# Patient Record
Sex: FEMALE | Race: WHITE | NOT HISPANIC OR LATINO | URBAN - METROPOLITAN AREA
[De-identification: names, ages, dates, MRNs, and addresses within clinical notes are randomized per-mention and may not be internally consistent; named-entity substitution may affect disease eponyms.]

---

## 2017-09-24 ENCOUNTER — INPATIENT (INPATIENT)
Facility: HOSPITAL | Age: 67
LOS: 4 days | Discharge: HOME | DRG: 605 | End: 2017-09-29
Attending: STUDENT IN AN ORGANIZED HEALTH CARE EDUCATION/TRAINING PROGRAM | Admitting: STUDENT IN AN ORGANIZED HEALTH CARE EDUCATION/TRAINING PROGRAM
Payer: MEDICARE

## 2017-09-24 VITALS
SYSTOLIC BLOOD PRESSURE: 124 MMHG | OXYGEN SATURATION: 95 % | TEMPERATURE: 99 F | HEART RATE: 70 BPM | RESPIRATION RATE: 16 BRPM | DIASTOLIC BLOOD PRESSURE: 83 MMHG

## 2017-09-24 DIAGNOSIS — L03.90 CELLULITIS, UNSPECIFIED: ICD-10-CM

## 2017-09-24 DIAGNOSIS — E78.00 PURE HYPERCHOLESTEROLEMIA, UNSPECIFIED: ICD-10-CM

## 2017-09-24 DIAGNOSIS — I48.91 UNSPECIFIED ATRIAL FIBRILLATION: ICD-10-CM

## 2017-09-24 DIAGNOSIS — I10 ESSENTIAL (PRIMARY) HYPERTENSION: ICD-10-CM

## 2017-09-24 LAB
ALBUMIN SERPL ELPH-MCNC: 4.2 G/DL — SIGNIFICANT CHANGE UP (ref 3.3–5)
ALP SERPL-CCNC: 80 U/L — SIGNIFICANT CHANGE UP (ref 40–120)
ALT FLD-CCNC: 13 U/L RC — SIGNIFICANT CHANGE UP (ref 10–45)
ANION GAP SERPL CALC-SCNC: 15 MMOL/L — SIGNIFICANT CHANGE UP (ref 5–17)
APTT BLD: 37.2 SEC — SIGNIFICANT CHANGE UP (ref 27.5–37.4)
AST SERPL-CCNC: 17 U/L — SIGNIFICANT CHANGE UP (ref 10–40)
BASOPHILS # BLD AUTO: 0.1 K/UL — SIGNIFICANT CHANGE UP (ref 0–0.2)
BASOPHILS NFR BLD AUTO: 0.6 % — SIGNIFICANT CHANGE UP (ref 0–2)
BILIRUB SERPL-MCNC: 0.4 MG/DL — SIGNIFICANT CHANGE UP (ref 0.2–1.2)
BUN SERPL-MCNC: 19 MG/DL — SIGNIFICANT CHANGE UP (ref 7–23)
CALCIUM SERPL-MCNC: 10.2 MG/DL — SIGNIFICANT CHANGE UP (ref 8.4–10.5)
CHLORIDE SERPL-SCNC: 103 MMOL/L — SIGNIFICANT CHANGE UP (ref 96–108)
CO2 SERPL-SCNC: 25 MMOL/L — SIGNIFICANT CHANGE UP (ref 22–31)
CREAT SERPL-MCNC: 0.81 MG/DL — SIGNIFICANT CHANGE UP (ref 0.5–1.3)
EOSINOPHIL # BLD AUTO: 0.1 K/UL — SIGNIFICANT CHANGE UP (ref 0–0.5)
EOSINOPHIL NFR BLD AUTO: 1 % — SIGNIFICANT CHANGE UP (ref 0–6)
GAS PNL BLDV: SIGNIFICANT CHANGE UP
GLUCOSE SERPL-MCNC: 96 MG/DL — SIGNIFICANT CHANGE UP (ref 70–99)
HCT VFR BLD CALC: 44.2 % — SIGNIFICANT CHANGE UP (ref 34.5–45)
HGB BLD-MCNC: 14.2 G/DL — SIGNIFICANT CHANGE UP (ref 11.5–15.5)
INR BLD: 1.42 RATIO — HIGH (ref 0.88–1.16)
LYMPHOCYTES # BLD AUTO: 2.9 K/UL — SIGNIFICANT CHANGE UP (ref 1–3.3)
LYMPHOCYTES # BLD AUTO: 24.2 % — SIGNIFICANT CHANGE UP (ref 13–44)
MCHC RBC-ENTMCNC: 30.2 PG — SIGNIFICANT CHANGE UP (ref 27–34)
MCHC RBC-ENTMCNC: 32.1 GM/DL — SIGNIFICANT CHANGE UP (ref 32–36)
MCV RBC AUTO: 93.9 FL — SIGNIFICANT CHANGE UP (ref 80–100)
MONOCYTES # BLD AUTO: 0.8 K/UL — SIGNIFICANT CHANGE UP (ref 0–0.9)
MONOCYTES NFR BLD AUTO: 7 % — SIGNIFICANT CHANGE UP (ref 2–14)
NEUTROPHILS # BLD AUTO: 8 K/UL — HIGH (ref 1.8–7.4)
NEUTROPHILS NFR BLD AUTO: 67.1 % — SIGNIFICANT CHANGE UP (ref 43–77)
PLATELET # BLD AUTO: 387 K/UL — SIGNIFICANT CHANGE UP (ref 150–400)
POTASSIUM SERPL-MCNC: 4.2 MMOL/L — SIGNIFICANT CHANGE UP (ref 3.5–5.3)
POTASSIUM SERPL-SCNC: 4.2 MMOL/L — SIGNIFICANT CHANGE UP (ref 3.5–5.3)
PROT SERPL-MCNC: 8 G/DL — SIGNIFICANT CHANGE UP (ref 6–8.3)
PROTHROM AB SERPL-ACNC: 15.6 SEC — HIGH (ref 9.8–12.7)
RBC # BLD: 4.71 M/UL — SIGNIFICANT CHANGE UP (ref 3.8–5.2)
RBC # FLD: 11.5 % — SIGNIFICANT CHANGE UP (ref 10.3–14.5)
SODIUM SERPL-SCNC: 143 MMOL/L — SIGNIFICANT CHANGE UP (ref 135–145)
WBC # BLD: 12 K/UL — HIGH (ref 3.8–10.5)
WBC # FLD AUTO: 12 K/UL — HIGH (ref 3.8–10.5)

## 2017-09-24 PROCEDURE — 73610 X-RAY EXAM OF ANKLE: CPT | Mod: 26,LT

## 2017-09-24 PROCEDURE — 73630 X-RAY EXAM OF FOOT: CPT | Mod: 26,LT

## 2017-09-24 PROCEDURE — 99223 1ST HOSP IP/OBS HIGH 75: CPT

## 2017-09-24 PROCEDURE — 93971 EXTREMITY STUDY: CPT | Mod: 26

## 2017-09-24 PROCEDURE — 99285 EMERGENCY DEPT VISIT HI MDM: CPT | Mod: GC

## 2017-09-24 RX ORDER — ASPIRIN/CALCIUM CARB/MAGNESIUM 324 MG
81 TABLET ORAL AT BEDTIME
Qty: 0 | Refills: 0 | Status: DISCONTINUED | OUTPATIENT
Start: 2017-09-24 | End: 2017-09-29

## 2017-09-24 RX ORDER — RIVAROXABAN 15 MG-20MG
20 KIT ORAL EVERY 24 HOURS
Qty: 0 | Refills: 0 | Status: DISCONTINUED | OUTPATIENT
Start: 2017-09-24 | End: 2017-09-29

## 2017-09-24 RX ORDER — DILTIAZEM HCL 120 MG
180 CAPSULE, EXT RELEASE 24 HR ORAL DAILY
Qty: 0 | Refills: 0 | Status: DISCONTINUED | OUTPATIENT
Start: 2017-09-24 | End: 2017-09-26

## 2017-09-24 RX ORDER — PIPERACILLIN AND TAZOBACTAM 4; .5 G/20ML; G/20ML
3.38 INJECTION, POWDER, LYOPHILIZED, FOR SOLUTION INTRAVENOUS ONCE
Qty: 0 | Refills: 0 | Status: COMPLETED | OUTPATIENT
Start: 2017-09-24 | End: 2017-09-24

## 2017-09-24 RX ORDER — VANCOMYCIN HCL 1 G
1250 VIAL (EA) INTRAVENOUS EVERY 12 HOURS
Qty: 0 | Refills: 0 | Status: DISCONTINUED | OUTPATIENT
Start: 2017-09-25 | End: 2017-09-25

## 2017-09-24 RX ORDER — ACETAMINOPHEN 500 MG
1000 TABLET ORAL ONCE
Qty: 0 | Refills: 0 | Status: DISCONTINUED | OUTPATIENT
Start: 2017-09-24 | End: 2017-09-29

## 2017-09-24 RX ORDER — METOPROLOL TARTRATE 50 MG
0 TABLET ORAL
Qty: 0 | Refills: 0 | COMMUNITY

## 2017-09-24 RX ORDER — HEPARIN SODIUM 5000 [USP'U]/ML
5000 INJECTION INTRAVENOUS; SUBCUTANEOUS EVERY 8 HOURS
Qty: 0 | Refills: 0 | Status: DISCONTINUED | OUTPATIENT
Start: 2017-09-24 | End: 2017-09-24

## 2017-09-24 RX ORDER — PIPERACILLIN AND TAZOBACTAM 4; .5 G/20ML; G/20ML
3.38 INJECTION, POWDER, LYOPHILIZED, FOR SOLUTION INTRAVENOUS EVERY 8 HOURS
Qty: 0 | Refills: 0 | Status: DISCONTINUED | OUTPATIENT
Start: 2017-09-24 | End: 2017-09-25

## 2017-09-24 RX ORDER — METOPROLOL TARTRATE 50 MG
100 TABLET ORAL DAILY
Qty: 0 | Refills: 0 | Status: DISCONTINUED | OUTPATIENT
Start: 2017-09-24 | End: 2017-09-26

## 2017-09-24 RX ORDER — LISINOPRIL 2.5 MG/1
40 TABLET ORAL AT BEDTIME
Qty: 0 | Refills: 0 | Status: DISCONTINUED | OUTPATIENT
Start: 2017-09-24 | End: 2017-09-26

## 2017-09-24 RX ORDER — INFLUENZA VIRUS VACCINE 15; 15; 15; 15 UG/.5ML; UG/.5ML; UG/.5ML; UG/.5ML
0.5 SUSPENSION INTRAMUSCULAR ONCE
Qty: 0 | Refills: 0 | Status: DISCONTINUED | OUTPATIENT
Start: 2017-09-24 | End: 2017-09-29

## 2017-09-24 RX ORDER — DILTIAZEM HCL 120 MG
1 CAPSULE, EXT RELEASE 24 HR ORAL
Qty: 0 | Refills: 0 | COMMUNITY

## 2017-09-24 RX ORDER — ATORVASTATIN CALCIUM 80 MG/1
10 TABLET, FILM COATED ORAL AT BEDTIME
Qty: 0 | Refills: 0 | Status: DISCONTINUED | OUTPATIENT
Start: 2017-09-24 | End: 2017-09-26

## 2017-09-24 RX ORDER — VANCOMYCIN HCL 1 G
1000 VIAL (EA) INTRAVENOUS ONCE
Qty: 0 | Refills: 0 | Status: COMPLETED | OUTPATIENT
Start: 2017-09-24 | End: 2017-09-24

## 2017-09-24 RX ORDER — ATORVASTATIN CALCIUM 80 MG/1
0 TABLET, FILM COATED ORAL
Qty: 0 | Refills: 0 | COMMUNITY

## 2017-09-24 RX ADMIN — ATORVASTATIN CALCIUM 10 MILLIGRAM(S): 80 TABLET, FILM COATED ORAL at 22:52

## 2017-09-24 RX ADMIN — Medication 250 MILLIGRAM(S): at 16:52

## 2017-09-24 RX ADMIN — PIPERACILLIN AND TAZOBACTAM 200 GRAM(S): 4; .5 INJECTION, POWDER, LYOPHILIZED, FOR SOLUTION INTRAVENOUS at 16:31

## 2017-09-24 RX ADMIN — Medication 100 MILLIGRAM(S): at 18:47

## 2017-09-24 RX ADMIN — Medication 81 MILLIGRAM(S): at 22:52

## 2017-09-24 NOTE — ED PROVIDER NOTE - CONDUCTED A DETAILED DISCUSSION WITH PATIENT AND/OR GUARDIAN REGARDING, MDM
need for outpatient follow-up/return to ED if symptoms worsen, persist or questions arise/**ATTENDING ADDENDUM (Dr. Burton Juárez): Anticipatory guidance provided.

## 2017-09-24 NOTE — H&P ADULT - PROBLEM SELECTOR PLAN 1
-appears to be nonpurulent cellulitis but pt recently hospitalized, can cover for MRSA  -d/w podiatry, they would like to continue vanc/zosyn for now and get MRI foot to eval abscess/hematoma, f/u results  -monitor vanc trough

## 2017-09-24 NOTE — ED PROVIDER NOTE - PLAN OF CARE
ATTENDING ADDENDUM (Dr. Burton Juárez): Goals of care include resolution of emergent/urgent symptoms and concerns, and restoration to baseline level of homeostasis.

## 2017-09-24 NOTE — H&P ADULT - NSHPPHYSICALEXAM_GEN_ALL_CORE
PHYSICAL EXAM:  GENERAL: NAD, well-developed  HEAD:  Atraumatic, Normocephalic  EYES: EOMI, PERRLA, conjunctiva and sclera clear  ENMT: Supple, No JVD  RESPIRATORY: Clear to auscultation bilaterally; No wheeze  CARDIOVASCULAR: Regular rate and rhythm; No murmurs, rubs, or gallops  GI: Soft, Nontender, Nondistended; Bowel sounds present  EXTREMITIES:  LLE swelling nonpitting  PSYCH: AAOx3  NEUROLOGY: L foot rom limited 2/2 swelling   SKIN: erythema dorsal/lateral midfoot, ecchymosis medial foot

## 2017-09-24 NOTE — ED ADULT NURSE NOTE - CHIEF COMPLAINT QUOTE
left leg cellulitis, [facility] last night after being hospitalized for cellulitis.

## 2017-09-24 NOTE — H&P ADULT - HISTORY OF PRESENT ILLNESS
66f pmh breast ca htn hl migraines afib on rivaroxaban who presents w/ foot wound. Pt was on a cruise 9/9 and went on a shore excursion, was on a bus and her L foot got stuck under a footrest, then banged it again against another metal maria eugenia on the bus. Pt went to the [facility] on the ship and received po clinda but could not complete the course. Pt was sent to the hospital in [facility] and hospitalized 9/16-19, given some sort of IV antibiotic, unknown name, discharged on amoxicillin which she took about 3-4 days of, and finally flew back home yesterday. Pt went to the [facility] today and was sent into the ER for further evaluation. Pt notes pain at L foot but is able to ambulate on it

## 2017-09-24 NOTE — ED ADULT TRIAGE NOTE - CHIEF COMPLAINT QUOTE
left leg cellulitis, returned from [facility] last night after being hospitalized for cellulitis.

## 2017-09-24 NOTE — ED PROVIDER NOTE - PHYSICAL EXAMINATION
**ATTENDING ADDENDUM (Dr. Burton Juárez): I have reviewed and substantially contributed to the elements of the PE as documented above. I have directly performed an examination of this patient in conjunction with the other members (EM resident/PA/NP) of the patient care team.

## 2017-09-24 NOTE — H&P ADULT - NSHPREVIEWOFSYSTEMS_GEN_ALL_CORE
Review of Systems:   CONSTITUTIONAL: No fever, chills, weight loss, or fatigue  EYES: No eye pain, visual disturbances, or discharge  ENMT:  No difficulty hearing, tinnitus, vertigo; No sinus or throat pain  NECK: No pain or stiffness  RESPIRATORY: No cough, wheezing, or shortness of breath  CARDIOVASCULAR: No chest pain, palpitations, dizziness, or leg swelling  GASTROINTESTINAL: No abdominal or epigastric pain. No nausea, vomiting, diarrhea. No melena or hematochezia.  GENITOURINARY: No dysuria, no hematuria  NEUROLOGICAL: No changes in strength/sensation   SKIN: as per hpi   LYMPH NODES: No enlarged glands  ENDOCRINE: No heat or cold intolerance; No hair loss  MUSCULOSKELETAL: as per hpi   PSYCHIATRIC: No depression, anxiety, mood swings  HEME/LYMPH: No easy bruising, or bleeding gums  ALLERGY AND IMMUNOLOGIC: No hives or eczema

## 2017-09-24 NOTE — ED ADULT NURSE NOTE - PMH
CA - breast cancer    Elevated cholesterol    Hx of mitral valve prolapse    Hypertension    Migraines    Uterine polyp    Vertigo  positional

## 2017-09-24 NOTE — ED PROVIDER NOTE - ATTENDING CONTRIBUTION TO CARE
**ATTENDING ADDENDUM (Dr. Burton Juárez): I attest that I have directly examined this patient and reviewed and formulated the diagnostic and therapeutic management plan in collaboration with the EM resident. Please see MDM note and remainder of EMR for findings from CC, HPI, ROS, and PE. (Morad)

## 2017-09-24 NOTE — ED PROVIDER NOTE - OBJECTIVE STATEMENT
66 F 9/9 was on cruise, had L foot stuck in a pedal, developed severe bruising but no open wound. Went to ship [facility], had XR which was no acute fx. Now unable to walk. Persistent swelling, so was given IV clindamycin but only took it for 2 days (about 1 week ago). Then had a boot placed which made pain worse. Went to hospital 8 days ago in [facility] because was told has a massive infection. Hospitalized 9/16-19. Received abx twice a day. No chills ?fever in hospital. Was discharged because looked improved. 66 F 9/9 was on cruise, had L foot stuck in a pedal, developed severe bruising but no open wound. Went to ship [facility], had XR which was no acute fx. Now unable to walk. Persistent swelling, so was given IV clindamycin but only took it for 2 days (about 1 week ago). Then had a boot placed which made pain worse. Went to hospital 8 days ago in [facility] because was told has a massive infection. Hospitalized 9/16-19. Received antibiotics twice a day. No chills ?fever in hospital. Was discharged because looked improved.  **ATTENDING ADDENDUM (Dr. Burton Juárez): I attest that I have directly and personally interviewed and examined this patient and elicited a comparable history of present illness and review of systems as documented, along with my EM resident. I attest that I have made significant contributions to the documentation where necessary and as noted in the EMR.

## 2017-09-24 NOTE — CONSULT NOTE ADULT - SUBJECTIVE AND OBJECTIVE BOX
Patient is a 66y old  Female who presents with a chief complaint of     HPI: 66 F 9/9 was on cruise, had L foot stuck in a pedal, developed severe bruising but no open wound. Went to ship [employer], had XR which was no acute fx. Now unable to walk. Persistent swelling, so was given IV clindamycin but only took it for 2 days (about 1 week ago). Then had a boot placed which made pain worse. Went to hospital 8 days ago in [facility] because was told has a massive infection. Hospitalized 9/16-19. Received abx twice a day. No chills ?fever in hospital. Was discharged because looked improved.      PAST MEDICAL & SURGICAL HISTORY:  Uterine polyp  Vertigo: positional  Migraines  CA - breast cancer  Elevated cholesterol  Hx of mitral valve prolapse  Hypertension  Anomaly of diaphragm: chest surgery at age 1  S/P D&C  S/P T&A (status post tonsillectomy and adenoidectomy)  History of bilateral mastectomy      MEDICATIONS  (STANDING):  acetaminophen  IVPB. 1000 milliGRAM(s) IV Intermittent once  vancomycin  IVPB 1000 milliGRAM(s) IV Intermittent once  piperacillin/tazobactam IVPB. 3.375 Gram(s) IV Intermittent once    MEDICATIONS  (PRN):      Allergies    No Known Allergies    Intolerances        VITALS:    Vital Signs Last 24 Hrs  T(C): 36.7 (24 Sep 2017 14:31), Max: 37.2 (24 Sep 2017 13:10)  T(F): 98 (24 Sep 2017 14:31), Max: 99 (24 Sep 2017 13:10)  HR: 70 (24 Sep 2017 14:31) (70 - 70)  BP: 147/97 (24 Sep 2017 14:31) (124/83 - 147/97)  BP(mean): --  RR: 17 (24 Sep 2017 14:31) (16 - 17)  SpO2: 100% (24 Sep 2017 14:31) (95% - 100%)    LABS:                          14.2   12.0  )-----------( 387      ( 24 Sep 2017 15:07 )             44.2       09-24    143  |  103  |  19  ----------------------------<  96  4.2   |  25  |  0.81    Ca    10.2      24 Sep 2017 15:07    TPro  8.0  /  Alb  4.2  /  TBili  0.4  /  DBili  x   /  AST  17  /  ALT  13  /  AlkPhos  80  09-24      CAPILLARY BLOOD GLUCOSE          PT/INR - ( 24 Sep 2017 15:07 )   PT: 15.6 sec;   INR: 1.42 ratio         PTT - ( 24 Sep 2017 15:07 )  PTT:37.2 sec    LOWER EXTREMITY PHYSICAL EXAM:    Vasular: DP/PT 2/4, B/L, CFT <3 seconds B/L, Temperature gradient increased to LF WNL to RF.   Neuro: Epicritic sensation intact to the level of digits, B/L.  Musculoskeletal/Ortho: pain on palpation of dorsal midfoot, at area of CFL ATFL LF  Skin: erythema to dorsal midfoot, lateral midfoot extending to lateral ankle, ecchymosis plantar medial midfoot to ankle      RADIOLOGY & ADDITIONAL STUDIES:    XR pending

## 2017-09-24 NOTE — ED PROVIDER NOTE - RESPIRATORY, MLM
Breath sounds clear and equal bilaterally. Breath sounds clear and equal bilaterally. **ATTENDING ADDENDUM (Dr. Burton Juárez): NO wheezing, rales, rhonchi, crackles, stridor, drooling, retractions, nasal flaring, or tripoding.

## 2017-09-24 NOTE — CONSULT NOTE ADULT - ASSESSMENT
67yo F s/p injury 9/9/17 to dorsal midfoot with plantarflexory force LF cellulitis  ·	Pt seen and evaluated   ·	ED plan to admit, podiatry agrees for IV abx cellulitis r/o underlying abscess/hematoma if no improvement in appearance, failed an inconsistent abx regimen while on vacation   ·	Cont IV abx as discussed  ·	Lukas LF cellulitis will monitor progression  ·	will f/u on XR  ·	d/w attending

## 2017-09-24 NOTE — ED PROVIDER NOTE - CHIEF COMPLAINT
The patient is a 66y Female complaining of cellulitis The patient is a 66y Female complaining of left foot and lower extremity cellulitis

## 2017-09-24 NOTE — ED PROVIDER NOTE - PSH
Anomaly of diaphragm  chest surgery at age 1  History of bilateral mastectomy    S/P D&C    S/P T&A (status post tonsillectomy and adenoidectomy)

## 2017-09-24 NOTE — ED PROVIDER NOTE - CONTEXT
known (describe)/**ATTENDING ADDENDUM (Dr. Burton Juárez): s/p antibiotics/foreign travel/known exposure (describe)

## 2017-09-24 NOTE — ED PROVIDER NOTE - EYES, MLM
**ATTENDING ADDENDUM (Dr. Burton Juárez): Extraocular muscle movements intact. Clear corneas bilaterally, pupils equal and round.

## 2017-09-24 NOTE — ED PROVIDER NOTE - MEDICAL DECISION MAKING DETAILS
66 F with cellulitis, multiple rounds of abx with no improvement. VSS. Exam with swelling and erythema of LLE with normal pulses and no crepitus. ABx, labs, XR, US given recent travel and likely admission for iv abx 66 F with cellulitis, multiple rounds of abx with no improvement. VSS. Exam with swelling and erythema of LLE with normal pulses and no crepitus. ABx, labs, XR, US given recent travel and likely admission for iv antibiotics  **ATTENDING MEDICAL DECISION MAKING/SYNTHESIS (Dr. Burton Juárez): I have reviewed the Chief Complaint, the HPI, the ROS, and have directly performed and confirmed the findings on the Physical Examination. I have reviewed the medical decision making with all providers, as applicable. The PROBLEM REPRESENTATION at this time is: 66-year-old woman s/p travel on cruise in [place] s/p blunt trauma to left foot with subsequent cellulitis and edema, s/p hospitalization for oral and inpatient antibiotics, now Here for evaluation upon return to the [place], with symptoms refractory to treatment. The MOST LIKELY DIAGNOSIS, and the LIST OF DIFFERENTIAL DIAGNOSES, includes (but is not limited to) the following: worsening cellulitis (treatment failure v. non-adherence v. resistant organism), deep venous thrombosis, other vascular etiology (unlikely given presentation and clinical findings), necrotizing fasciitis (NO evidence), serious bacterial infection or sepsis/severe sepsis e.g. shock or equivalent (NO evidence), osteoyelitis (NO evidence), electrolyte-metabolic-endocrine derangements, dehydration. The likelihood of each of these diagnoses has been appropriately considered in the context of this patient's presentation and my evaluation. PLAN: as described in EMR, including diagnostics, therapeutics and consultation as clinically warranted. I will continue to reevaluate the patient, including the results of all testing, and monitor response to therapy throughout the patient's course in the ED.

## 2017-09-24 NOTE — ED PROVIDER NOTE - SKIN COLOR
LLE with pitting edema to knee, erythema to ankle, small abrasion to dorsal aspect of foot with hardeep ctive drainage. pulses intact LLE with pitting edema to knee, erythema to ankle, small abrasion to dorsal aspect of foot with no active drainage. pulses intact **ATTENDING ADDENDUM (Dr. Burton Juárez): NO distal discoloration. Agree with remainder of notation.

## 2017-09-24 NOTE — ED PROVIDER NOTE - CARE PLAN
Principal Discharge DX:	Cellulitis Principal Discharge DX:	Cellulitis  Goal:	ATTENDING ADDENDUM (Dr. Burton Juárez): Goals of care include resolution of emergent/urgent symptoms and concerns, and restoration to baseline level of homeostasis.

## 2017-09-24 NOTE — ED ADULT NURSE NOTE - OBJECTIVE STATEMENT
66 y f came to the ed with left foot redness. patient states she hit her foot on an excursion while in [place]. states being in a cruise hospital for several days receiving IV abx until they d/c her and recommend she go to an actual hospital. patient states she was hospitalized in [facility] but did not receive the proper care for her condition. her left foot is wrapped although she is able to move her feet and has good cap refill. patient states the redness has not improved. denies any fevers, chills, chest pain, sob. skin is warm and dry.

## 2017-09-24 NOTE — ED PROVIDER NOTE - PROGRESS NOTE DETAILS
**ATTENDING ADDENDUM (Dr. Burton Juárez): patient serially evaluated throughout ED course by ED team present prior to my arrival. NO acute deterioration up to this time in the ED. Workup initiated by EM resident Vance. Currently out of the ED receiving ED diagnostics (US of the lower extremities). Will evaluate patient when she returns from her study. Reviewed goals/plan of care with EM resident Vance. Agree with ordered as noted. Will provide antibiotics in addition to diagnostics. Likely admission to [facility] hospitalist. **ATTENDING ADDENDUM (Dr. Burton Juárez): labs noted at this time. Lactate <2. Awaiting return from [place]. **ATTENDING ADDENDUM (Dr. Burton Juárez): patient serially evaluated throughout ED course. NO acute deterioration up to this time in the ED. Seen by Podiatry. Agrees with goals/plan of ED care. Agree with admission. ED diagnostics reviewed with patient. Anticipatory guidance provided. Will continue to observe and monitor closely until definitive disposition is made.

## 2017-09-25 LAB
ANION GAP SERPL CALC-SCNC: 13 MMOL/L — SIGNIFICANT CHANGE UP (ref 5–17)
BASOPHILS # BLD AUTO: 0.03 K/UL — SIGNIFICANT CHANGE UP (ref 0–0.2)
BASOPHILS NFR BLD AUTO: 0.4 % — SIGNIFICANT CHANGE UP (ref 0–2)
BUN SERPL-MCNC: 16 MG/DL — SIGNIFICANT CHANGE UP (ref 7–23)
CALCIUM SERPL-MCNC: 10.2 MG/DL — SIGNIFICANT CHANGE UP (ref 8.4–10.5)
CHLORIDE SERPL-SCNC: 102 MMOL/L — SIGNIFICANT CHANGE UP (ref 96–108)
CO2 SERPL-SCNC: 23 MMOL/L — SIGNIFICANT CHANGE UP (ref 22–31)
CREAT SERPL-MCNC: 0.85 MG/DL — SIGNIFICANT CHANGE UP (ref 0.5–1.3)
EOSINOPHIL # BLD AUTO: 0.16 K/UL — SIGNIFICANT CHANGE UP (ref 0–0.5)
EOSINOPHIL NFR BLD AUTO: 1.9 % — SIGNIFICANT CHANGE UP (ref 0–6)
GLUCOSE SERPL-MCNC: 91 MG/DL — SIGNIFICANT CHANGE UP (ref 70–99)
HCT VFR BLD CALC: 39.8 % — SIGNIFICANT CHANGE UP (ref 34.5–45)
HGB BLD-MCNC: 12.9 G/DL — SIGNIFICANT CHANGE UP (ref 11.5–15.5)
IMM GRANULOCYTES NFR BLD AUTO: 0.1 % — SIGNIFICANT CHANGE UP (ref 0–1.5)
LYMPHOCYTES # BLD AUTO: 2.52 K/UL — SIGNIFICANT CHANGE UP (ref 1–3.3)
LYMPHOCYTES # BLD AUTO: 30.6 % — SIGNIFICANT CHANGE UP (ref 13–44)
MCHC RBC-ENTMCNC: 29.4 PG — SIGNIFICANT CHANGE UP (ref 27–34)
MCHC RBC-ENTMCNC: 32.4 GM/DL — SIGNIFICANT CHANGE UP (ref 32–36)
MCV RBC AUTO: 90.7 FL — SIGNIFICANT CHANGE UP (ref 80–100)
MONOCYTES # BLD AUTO: 0.68 K/UL — SIGNIFICANT CHANGE UP (ref 0–0.9)
MONOCYTES NFR BLD AUTO: 8.3 % — SIGNIFICANT CHANGE UP (ref 2–14)
NEUTROPHILS # BLD AUTO: 4.83 K/UL — SIGNIFICANT CHANGE UP (ref 1.8–7.4)
NEUTROPHILS NFR BLD AUTO: 58.7 % — SIGNIFICANT CHANGE UP (ref 43–77)
PLATELET # BLD AUTO: 366 K/UL — SIGNIFICANT CHANGE UP (ref 150–400)
POTASSIUM SERPL-MCNC: 4.7 MMOL/L — SIGNIFICANT CHANGE UP (ref 3.5–5.3)
POTASSIUM SERPL-SCNC: 4.7 MMOL/L — SIGNIFICANT CHANGE UP (ref 3.5–5.3)
RBC # BLD: 4.39 M/UL — SIGNIFICANT CHANGE UP (ref 3.8–5.2)
RBC # FLD: 12.6 % — SIGNIFICANT CHANGE UP (ref 10.3–14.5)
SODIUM SERPL-SCNC: 138 MMOL/L — SIGNIFICANT CHANGE UP (ref 135–145)
WBC # BLD: 8.23 K/UL — SIGNIFICANT CHANGE UP (ref 3.8–10.5)
WBC # FLD AUTO: 8.23 K/UL — SIGNIFICANT CHANGE UP (ref 3.8–10.5)

## 2017-09-25 PROCEDURE — 73720 MRI LWR EXTREMITY W/O&W/DYE: CPT | Mod: 26,LT

## 2017-09-25 RX ORDER — CEFAZOLIN SODIUM 1 G
1000 VIAL (EA) INJECTION EVERY 8 HOURS
Qty: 0 | Refills: 0 | Status: DISCONTINUED | OUTPATIENT
Start: 2017-09-25 | End: 2017-09-29

## 2017-09-25 RX ORDER — ACETAMINOPHEN 500 MG
975 TABLET ORAL ONCE
Qty: 0 | Refills: 0 | Status: COMPLETED | OUTPATIENT
Start: 2017-09-25 | End: 2017-09-25

## 2017-09-25 RX ORDER — ACETAMINOPHEN 500 MG
650 TABLET ORAL EVERY 6 HOURS
Qty: 0 | Refills: 0 | Status: DISCONTINUED | OUTPATIENT
Start: 2017-09-25 | End: 2017-09-29

## 2017-09-25 RX ADMIN — LISINOPRIL 40 MILLIGRAM(S): 2.5 TABLET ORAL at 02:08

## 2017-09-25 RX ADMIN — Medication 166.67 MILLIGRAM(S): at 05:50

## 2017-09-25 RX ADMIN — Medication 180 MILLIGRAM(S): at 05:50

## 2017-09-25 RX ADMIN — RIVAROXABAN 20 MILLIGRAM(S): KIT at 21:32

## 2017-09-25 RX ADMIN — ATORVASTATIN CALCIUM 10 MILLIGRAM(S): 80 TABLET, FILM COATED ORAL at 21:32

## 2017-09-25 RX ADMIN — LISINOPRIL 40 MILLIGRAM(S): 2.5 TABLET ORAL at 21:32

## 2017-09-25 RX ADMIN — Medication 100 MILLIGRAM(S): at 21:31

## 2017-09-25 RX ADMIN — Medication 100 MILLIGRAM(S): at 05:50

## 2017-09-25 RX ADMIN — Medication 81 MILLIGRAM(S): at 22:54

## 2017-09-25 RX ADMIN — Medication 100 MILLIGRAM(S): at 13:13

## 2017-09-25 RX ADMIN — Medication 975 MILLIGRAM(S): at 12:44

## 2017-09-25 RX ADMIN — PIPERACILLIN AND TAZOBACTAM 25 GRAM(S): 4; .5 INJECTION, POWDER, LYOPHILIZED, FOR SOLUTION INTRAVENOUS at 00:16

## 2017-09-25 NOTE — CONSULT NOTE ADULT - SUBJECTIVE AND OBJECTIVE BOX
[facility], Division of Infectious Diseases  GRABIEL Dias A. Lee  [phone]  DEWAYNE PROCTOR  66y, Female  [id number]    HPI:  66f pmh remote breast ca left side, htn hl migraines afib on rivaroxaban who presents w/ foot wound.  SEnt in by urgent care for evaluation of cellulitis.     Pt was on a cruise 9/9 and went on a shore excursion, was on a bus and her L foot got stuck under a footrest, then banged it again against another metal maria eugenia on the bus. Pt went to the [facility] on the ship and received po clinda but could not complete the course. Pt was sent to the hospital in [facility] and hospitalized 9/16-19, given some sort of IV antibiotic, unknown name, discharged on amoxicillin which she took about 3-4 days of, and finally flew back home yesterday. Pt went to the [facility] clinic today and was sent into the ER for further evaluation. Pt notes pain at L foot but is able to ambulate on it    Foot is less red and less swollen.  She is able to stand now.    NO fevers and chills now, but initially fevers and chills.    PMH/PSH--  Uterine polyp  Vertigo  Migraines  CA - breast cancer  Elevated cholesterol  Hx of mitral valve prolapse  Hypertension  Anomaly of diaphragm  S/P D&C  S/P T&A (status post tonsillectomy and adenoidectomy)  History of bilateral mastectomy  left axilla lymph node dissection    Allergies--NKDA      Medications--  Antibiotics: vancomycin  IVPB 1250 milliGRAM(s) IV Intermittent every 12 hours  piperacillin/tazobactam IVPB. 3.375 Gram(s) IV Intermittent every 8 hours    Immunologic: influenza   Vaccine 0.5 milliLiter(s) IntraMuscular once    Other: acetaminophen  IVPB.  aspirin  chewable  lisinopril  rivaroxaban  atorvastatin  metoprolol succinate ER  diltiazem   CD      Social History--  EtOH: denies ***  Tobacco: denies ***  Drug Use: denies ***  lives alone  Family/Marital History--  Family history of breast cancer (Mother, Sibling)    Remainder not relevant to clincial concern.    Travel/Environmental/Occupational History:  recently cruise to [facility]    Review of Systems:  A >=10-point review of systems was obtained.     Pertinent positives and negatives--  Constitutional: No fevers. No Chills. No Rigors.   Eyes: no blurry vision  ENMT: no sore throat  Cardiovascular: No chest pain. No palpitations.  Respiratory: No shortness of breath. No cough.  Gastrointestinal: No nausea or vomiting. No diarrhea or constipation.   Genitourinary: no dysuria  Musculoskeletal: left foot pain  Skin: no rash  Neurologic: no headache  Psychiatric: no anxiety, no depression    Review of systems otherwise negative except as previously noted.    Physical Exam--  Vital Signs: T(F): 98.1 (09-25-17 @ 09:25), Max: 99 (09-24-17 @ 13:10)  HR: 66 (09-25-17 @ 09:25)  BP: 134/85 (09-25-17 @ 09:25)  RR: 18 (09-25-17 @ 09:25)  SpO2: 97% (09-25-17 @ 09:25)  Wt(kg): --  General: Nontoxic-appearing Female in no acute distress.  HEENT: AT/NC.  Anicteric. Conjunctiva pink and moist. Oropharynx clear. Dentition fair.  Neck: Not rigid. No sense of mass.  Nodes: None palpable.  Lungs: Clear bilaterally without rales, wheezing or rhonchi  Heart: Regular rate and rhythm. No Murmur.   Abdomen: Bowel sounds present and normoactive. Soft. Nondistended. Nontender. No sense of mass. No organomegaly.  Back: No spinal tenderness. No costovertebral angle tenderness.   Extremities: No cyanosis or clubbing. left foot edema dorsum with ecchymosis, not warm , minimally tender.  ankle edema and decreased range of motion  Skin: Warm. Dry. Good turgor. No rash. No vasculitic stigmata.  Psychiatric: Appropriate affect and mood for situation.         Laboratory & Imaging Data--  CBC                        12.9   8.23  )-----------( 366      ( 25 Sep 2017 09:00 )             39.8       Chemistries  09-25    138  |  102  |  16  ----------------------------<  91  4.7   |  23  |  0.85    Ca    10.2      25 Sep 2017 08:57    TPro  8.0  /  Alb  4.2  /  TBili  0.4  /  DBili  x   /  AST  17  /  ALT  13  /  AlkPhos  80  09-24      Culture Data  < from: Xray Ankle Complete 3 Views, Left (09.24.17 @ 16:28) >    EXAM:  ANKLE LEFT (MINIMUM 3 VIEWS)                          EXAM:  FOOT COMPLETE LEFT (MIN 3 VIEWS)                            PROCEDURE DATE:  09/24/2017            INTERPRETATION:  CLINICAL INFORMATION: Left foot pain and swelling    TECHNIQUE: AP, oblique, and lateral views of the left foot    COMPARISON: None    Left foot findings:    Extensive soft tissue edema of the dorsum of the left foot. No evidence   of subcutaneous gas. No acute fractures or dislocations.    Left ankle findings:    Soft tissue edema of the soft tissues adjacent to the medial malleolus.   No evidence of subcutaneous gas. No acute fractures or dislocations.    Impression:    Extensive soft tissue edema of the dorsum of the left foot and soft   tissues adjacent tothe left medial malleolus. No subcutaneous gas.      < end of copied text >

## 2017-09-25 NOTE — CONSULT NOTE ADULT - PROBLEM SELECTOR RECOMMENDATION 9
f/u blood cx  nontoxic, no fever and stated decreased edema-- no concern for mrsa d/c vancomycin  can tailor antibx  to ancef  f/o MRI   keep elevated

## 2017-09-25 NOTE — CONSULT NOTE ADULT - ASSESSMENT
66F with afib on AC, remote breast cancer s/p surgery no chemo , no xrt.  here with trauma about 3 weeks ago [place] 2 [place]x [place]  With persistent inflammation  cellulits

## 2017-09-26 ENCOUNTER — RESULT REVIEW (OUTPATIENT)
Age: 67
End: 2017-09-26

## 2017-09-26 DIAGNOSIS — S80.10XA CONTUSION OF UNSPECIFIED LOWER LEG, INITIAL ENCOUNTER: ICD-10-CM

## 2017-09-26 LAB
ANION GAP SERPL CALC-SCNC: 11 MMOL/L — SIGNIFICANT CHANGE UP (ref 5–17)
APTT BLD: 41.7 SEC — HIGH (ref 27.5–37.4)
BASOPHILS # BLD AUTO: 0.03 K/UL — SIGNIFICANT CHANGE UP (ref 0–0.2)
BASOPHILS NFR BLD AUTO: 0.4 % — SIGNIFICANT CHANGE UP (ref 0–2)
BLD GP AB SCN SERPL QL: NEGATIVE — SIGNIFICANT CHANGE UP
BUN SERPL-MCNC: 12 MG/DL — SIGNIFICANT CHANGE UP (ref 7–23)
CALCIUM SERPL-MCNC: 9.8 MG/DL — SIGNIFICANT CHANGE UP (ref 8.4–10.5)
CHLORIDE SERPL-SCNC: 102 MMOL/L — SIGNIFICANT CHANGE UP (ref 96–108)
CO2 SERPL-SCNC: 27 MMOL/L — SIGNIFICANT CHANGE UP (ref 22–31)
CREAT SERPL-MCNC: 0.68 MG/DL — SIGNIFICANT CHANGE UP (ref 0.5–1.3)
EOSINOPHIL # BLD AUTO: 0.15 K/UL — SIGNIFICANT CHANGE UP (ref 0–0.5)
EOSINOPHIL NFR BLD AUTO: 1.8 % — SIGNIFICANT CHANGE UP (ref 0–6)
GLUCOSE SERPL-MCNC: 93 MG/DL — SIGNIFICANT CHANGE UP (ref 70–99)
HCT VFR BLD CALC: 39.5 % — SIGNIFICANT CHANGE UP (ref 34.5–45)
HGB BLD-MCNC: 12.9 G/DL — SIGNIFICANT CHANGE UP (ref 11.5–15.5)
IMM GRANULOCYTES NFR BLD AUTO: 0.1 % — SIGNIFICANT CHANGE UP (ref 0–1.5)
INR BLD: 1.51 RATIO — HIGH (ref 0.88–1.16)
LYMPHOCYTES # BLD AUTO: 2.42 K/UL — SIGNIFICANT CHANGE UP (ref 1–3.3)
LYMPHOCYTES # BLD AUTO: 28.5 % — SIGNIFICANT CHANGE UP (ref 13–44)
MCHC RBC-ENTMCNC: 29.5 PG — SIGNIFICANT CHANGE UP (ref 27–34)
MCHC RBC-ENTMCNC: 32.7 GM/DL — SIGNIFICANT CHANGE UP (ref 32–36)
MCV RBC AUTO: 90.2 FL — SIGNIFICANT CHANGE UP (ref 80–100)
MONOCYTES # BLD AUTO: 0.68 K/UL — SIGNIFICANT CHANGE UP (ref 0–0.9)
MONOCYTES NFR BLD AUTO: 8 % — SIGNIFICANT CHANGE UP (ref 2–14)
NEUTROPHILS # BLD AUTO: 5.19 K/UL — SIGNIFICANT CHANGE UP (ref 1.8–7.4)
NEUTROPHILS NFR BLD AUTO: 61.2 % — SIGNIFICANT CHANGE UP (ref 43–77)
PLATELET # BLD AUTO: 333 K/UL — SIGNIFICANT CHANGE UP (ref 150–400)
POTASSIUM SERPL-MCNC: 4.8 MMOL/L — SIGNIFICANT CHANGE UP (ref 3.5–5.3)
POTASSIUM SERPL-SCNC: 4.8 MMOL/L — SIGNIFICANT CHANGE UP (ref 3.5–5.3)
PROTHROM AB SERPL-ACNC: 16.6 SEC — HIGH (ref 9.8–12.7)
RBC # BLD: 4.38 M/UL — SIGNIFICANT CHANGE UP (ref 3.8–5.2)
RBC # FLD: 12.7 % — SIGNIFICANT CHANGE UP (ref 10.3–14.5)
RH IG SCN BLD-IMP: NEGATIVE — SIGNIFICANT CHANGE UP
SODIUM SERPL-SCNC: 140 MMOL/L — SIGNIFICANT CHANGE UP (ref 135–145)
WBC # BLD: 8.48 K/UL — SIGNIFICANT CHANGE UP (ref 3.8–10.5)
WBC # FLD AUTO: 8.48 K/UL — SIGNIFICANT CHANGE UP (ref 3.8–10.5)

## 2017-09-26 PROCEDURE — 88304 TISSUE EXAM BY PATHOLOGIST: CPT | Mod: 26

## 2017-09-26 PROCEDURE — 71010: CPT | Mod: 26

## 2017-09-26 RX ORDER — OXYCODONE AND ACETAMINOPHEN 5; 325 MG/1; MG/1
2 TABLET ORAL EVERY 4 HOURS
Qty: 0 | Refills: 0 | Status: DISCONTINUED | OUTPATIENT
Start: 2017-09-26 | End: 2017-09-29

## 2017-09-26 RX ORDER — LISINOPRIL 2.5 MG/1
40 TABLET ORAL AT BEDTIME
Qty: 0 | Refills: 0 | Status: DISCONTINUED | OUTPATIENT
Start: 2017-09-26 | End: 2017-09-29

## 2017-09-26 RX ORDER — HYDROMORPHONE HYDROCHLORIDE 2 MG/ML
0.25 INJECTION INTRAMUSCULAR; INTRAVENOUS; SUBCUTANEOUS
Qty: 0 | Refills: 0 | Status: DISCONTINUED | OUTPATIENT
Start: 2017-09-26 | End: 2017-09-26

## 2017-09-26 RX ORDER — ATORVASTATIN CALCIUM 80 MG/1
10 TABLET, FILM COATED ORAL AT BEDTIME
Qty: 0 | Refills: 0 | Status: DISCONTINUED | OUTPATIENT
Start: 2017-09-26 | End: 2017-09-29

## 2017-09-26 RX ORDER — ONDANSETRON 8 MG/1
4 TABLET, FILM COATED ORAL ONCE
Qty: 0 | Refills: 0 | Status: DISCONTINUED | OUTPATIENT
Start: 2017-09-26 | End: 2017-09-29

## 2017-09-26 RX ORDER — METOPROLOL TARTRATE 50 MG
100 TABLET ORAL DAILY
Qty: 0 | Refills: 0 | Status: DISCONTINUED | OUTPATIENT
Start: 2017-09-26 | End: 2017-09-29

## 2017-09-26 RX ORDER — SODIUM CHLORIDE 9 MG/ML
1000 INJECTION INTRAMUSCULAR; INTRAVENOUS; SUBCUTANEOUS
Qty: 0 | Refills: 0 | Status: DISCONTINUED | OUTPATIENT
Start: 2017-09-26 | End: 2017-09-29

## 2017-09-26 RX ORDER — ACETAMINOPHEN 500 MG
650 TABLET ORAL EVERY 6 HOURS
Qty: 0 | Refills: 0 | Status: DISCONTINUED | OUTPATIENT
Start: 2017-09-26 | End: 2017-09-29

## 2017-09-26 RX ORDER — MORPHINE SULFATE 50 MG/1
2 CAPSULE, EXTENDED RELEASE ORAL EVERY 6 HOURS
Qty: 0 | Refills: 0 | Status: DISCONTINUED | OUTPATIENT
Start: 2017-09-26 | End: 2017-09-29

## 2017-09-26 RX ORDER — DILTIAZEM HCL 120 MG
180 CAPSULE, EXT RELEASE 24 HR ORAL DAILY
Qty: 0 | Refills: 0 | Status: DISCONTINUED | OUTPATIENT
Start: 2017-09-26 | End: 2017-09-29

## 2017-09-26 RX ADMIN — Medication 180 MILLIGRAM(S): at 05:42

## 2017-09-26 RX ADMIN — ATORVASTATIN CALCIUM 10 MILLIGRAM(S): 80 TABLET, FILM COATED ORAL at 22:05

## 2017-09-26 RX ADMIN — Medication 100 MILLIGRAM(S): at 05:42

## 2017-09-26 RX ADMIN — Medication 100 MILLIGRAM(S): at 22:05

## 2017-09-26 RX ADMIN — Medication 81 MILLIGRAM(S): at 22:05

## 2017-09-26 RX ADMIN — Medication 100 MILLIGRAM(S): at 06:40

## 2017-09-26 RX ADMIN — Medication 100 MILLIGRAM(S): at 13:16

## 2017-09-26 NOTE — PRE-OP CHECKLIST - 1.
Pt reported left hand tingling from wrist to pinky finger and finger next to it.
patient oriented to unit and procedure

## 2017-09-26 NOTE — BRIEF OPERATIVE NOTE - PROCEDURE
<<-----Click on this checkbox to enter Procedure Incision and drainage abscess  09/26/2017    Active  CVALLE

## 2017-09-26 NOTE — BRIEF OPERATIVE NOTE - OPERATION/FINDINGS
XXX of purulent fluid expressed. All non-viable skin, and soft tissue was excised. 15ml of coagulated hemorrhagic fluid expressed. All non-viable skin, and soft tissue was excised.

## 2017-09-27 ENCOUNTER — TRANSCRIPTION ENCOUNTER (OUTPATIENT)
Age: 67
End: 2017-09-27

## 2017-09-27 DIAGNOSIS — S80.12XD CONTUSION OF LEFT LOWER LEG, SUBSEQUENT ENCOUNTER: ICD-10-CM

## 2017-09-27 LAB
ANION GAP SERPL CALC-SCNC: 12 MMOL/L — SIGNIFICANT CHANGE UP (ref 5–17)
BUN SERPL-MCNC: 15 MG/DL — SIGNIFICANT CHANGE UP (ref 7–23)
CALCIUM SERPL-MCNC: 9.5 MG/DL — SIGNIFICANT CHANGE UP (ref 8.4–10.5)
CHLORIDE SERPL-SCNC: 102 MMOL/L — SIGNIFICANT CHANGE UP (ref 96–108)
CO2 SERPL-SCNC: 23 MMOL/L — SIGNIFICANT CHANGE UP (ref 22–31)
CREAT SERPL-MCNC: 0.86 MG/DL — SIGNIFICANT CHANGE UP (ref 0.5–1.3)
GLUCOSE SERPL-MCNC: 138 MG/DL — HIGH (ref 70–99)
HCT VFR BLD CALC: 40.2 % — SIGNIFICANT CHANGE UP (ref 34.5–45)
HGB BLD-MCNC: 13.1 G/DL — SIGNIFICANT CHANGE UP (ref 11.5–15.5)
MCHC RBC-ENTMCNC: 29.5 PG — SIGNIFICANT CHANGE UP (ref 27–34)
MCHC RBC-ENTMCNC: 32.6 GM/DL — SIGNIFICANT CHANGE UP (ref 32–36)
MCV RBC AUTO: 90.5 FL — SIGNIFICANT CHANGE UP (ref 80–100)
PLATELET # BLD AUTO: 362 K/UL — SIGNIFICANT CHANGE UP (ref 150–400)
POTASSIUM SERPL-MCNC: 5.2 MMOL/L — SIGNIFICANT CHANGE UP (ref 3.5–5.3)
POTASSIUM SERPL-SCNC: 5.2 MMOL/L — SIGNIFICANT CHANGE UP (ref 3.5–5.3)
RBC # BLD: 4.44 M/UL — SIGNIFICANT CHANGE UP (ref 3.8–5.2)
RBC # FLD: 12.4 % — SIGNIFICANT CHANGE UP (ref 10.3–14.5)
SODIUM SERPL-SCNC: 137 MMOL/L — SIGNIFICANT CHANGE UP (ref 135–145)
WBC # BLD: 7.9 K/UL — SIGNIFICANT CHANGE UP (ref 3.8–10.5)
WBC # FLD AUTO: 7.9 K/UL — SIGNIFICANT CHANGE UP (ref 3.8–10.5)

## 2017-09-27 RX ORDER — OXYCODONE AND ACETAMINOPHEN 5; 325 MG/1; MG/1
1 TABLET ORAL ONCE
Qty: 0 | Refills: 0 | Status: DISCONTINUED | OUTPATIENT
Start: 2017-09-27 | End: 2017-09-27

## 2017-09-27 RX ORDER — ACETAMINOPHEN 500 MG
975 TABLET ORAL ONCE
Qty: 0 | Refills: 0 | Status: COMPLETED | OUTPATIENT
Start: 2017-09-27 | End: 2017-09-27

## 2017-09-27 RX ADMIN — Medication 100 MILLIGRAM(S): at 06:38

## 2017-09-27 RX ADMIN — Medication 100 MILLIGRAM(S): at 14:34

## 2017-09-27 RX ADMIN — Medication 81 MILLIGRAM(S): at 21:39

## 2017-09-27 RX ADMIN — Medication 100 MILLIGRAM(S): at 05:19

## 2017-09-27 RX ADMIN — MORPHINE SULFATE 2 MILLIGRAM(S): 50 CAPSULE, EXTENDED RELEASE ORAL at 09:20

## 2017-09-27 RX ADMIN — ATORVASTATIN CALCIUM 10 MILLIGRAM(S): 80 TABLET, FILM COATED ORAL at 21:38

## 2017-09-27 RX ADMIN — LISINOPRIL 40 MILLIGRAM(S): 2.5 TABLET ORAL at 21:39

## 2017-09-27 RX ADMIN — MORPHINE SULFATE 2 MILLIGRAM(S): 50 CAPSULE, EXTENDED RELEASE ORAL at 08:59

## 2017-09-27 RX ADMIN — Medication 975 MILLIGRAM(S): at 15:57

## 2017-09-27 RX ADMIN — Medication 180 MILLIGRAM(S): at 06:38

## 2017-09-27 RX ADMIN — OXYCODONE AND ACETAMINOPHEN 1 TABLET(S): 5; 325 TABLET ORAL at 22:31

## 2017-09-27 RX ADMIN — Medication 975 MILLIGRAM(S): at 16:30

## 2017-09-27 RX ADMIN — Medication 100 MILLIGRAM(S): at 21:41

## 2017-09-27 RX ADMIN — OXYCODONE AND ACETAMINOPHEN 1 TABLET(S): 5; 325 TABLET ORAL at 23:00

## 2017-09-27 RX ADMIN — RIVAROXABAN 20 MILLIGRAM(S): KIT at 19:25

## 2017-09-27 NOTE — PROGRESS NOTE ADULT - ATTENDING COMMENTS
Discussed with patient.     Toni Paulino MD  [phone]
Discussed with the patient and her friend at bedside in detail. All Q's answered.

## 2017-09-28 RX ADMIN — Medication 100 MILLIGRAM(S): at 22:26

## 2017-09-28 RX ADMIN — RIVAROXABAN 20 MILLIGRAM(S): KIT at 19:14

## 2017-09-28 RX ADMIN — Medication 180 MILLIGRAM(S): at 05:41

## 2017-09-28 RX ADMIN — Medication 100 MILLIGRAM(S): at 14:37

## 2017-09-28 RX ADMIN — LISINOPRIL 40 MILLIGRAM(S): 2.5 TABLET ORAL at 22:24

## 2017-09-28 RX ADMIN — ATORVASTATIN CALCIUM 10 MILLIGRAM(S): 80 TABLET, FILM COATED ORAL at 22:23

## 2017-09-28 RX ADMIN — Medication 100 MILLIGRAM(S): at 05:41

## 2017-09-28 RX ADMIN — Medication 81 MILLIGRAM(S): at 22:24

## 2017-09-28 NOTE — PROGRESS NOTE ADULT - PROBLEM SELECTOR PLAN 3
cont toprol, diltiazem  cont xarelto
cont toprol, diltiazem  cont xarelto
cont toprol/diltizaem/ramipril
cont toprol/diltizaem/ramipril

## 2017-09-28 NOTE — PROGRESS NOTE ADULT - PROBLEM SELECTOR PROBLEM 2
Hematoma of lower extremity, left, subsequent encounter
Hematoma of lower extremity
Afib
Hematoma of lower extremity, left, subsequent encounter
Afib

## 2017-09-28 NOTE — PHYSICAL THERAPY INITIAL EVALUATION ADULT - ADDITIONAL COMMENTS
Pt states she lives alone in an apartment with elevator access. Pt reports prior to admission she was completely independent with all functional mobility and ADLs. Pt [place] perform all functional mobility using a standard walker. Pt states she owns a standard walker from [facility].

## 2017-09-28 NOTE — PHYSICAL THERAPY INITIAL EVALUATION ADULT - PERTINENT HX OF CURRENT PROBLEM, REHAB EVAL
Pt is a 66 year old female presented to hospital with foot wound/cellulitis of L foot. Pt reported being on a cruise (9/9/17) when L foot got stuck under a footrest on bus, then banged it again against another metal maria eugenia on the bus. Pt went to hospital in [facility] and hospitalized (9/16-19). Pt went to [facility] clinic when arriving back home, and was sent to the ER for further evaluation. Pt now s/p incision and drainage of L foot absess, receiving IV Abx.

## 2017-09-28 NOTE — PROGRESS NOTE ADULT - PROBLEM SELECTOR PLAN 2
POD #2 I+D  appreciate podiatry intervention and post-op care    limited weight bearing on left side; leg elevation in bed and in chair
Local care per podiatry
Await drainage  Send OR Cx  Local care per podiatry  OR cx may be negative at this point given protracted Abx. Likely relegated to an empiric course of Rx
Local care per podiatry
POD #1 I+D  appreciate podiatry intervention and post-op care  packing replaced today; compressive dressing in place  limited weight bearing on left side; leg elevation in bed and in chair
cont toprol diltiazem  cont xarelto
cont toprol diltiazem  cont xarelto

## 2017-09-28 NOTE — PHYSICAL THERAPY INITIAL EVALUATION ADULT - LEVEL OF INDEPENDENCE: STAIR NEGOTIATION, REHAB EVAL
Pt declined to practice stair negotiation. Pt stated having elevator access and stated feeling comfortable with stairs if needed. PT reviewed stair negotiation mechanics/sequencing.

## 2017-09-29 ENCOUNTER — TRANSCRIPTION ENCOUNTER (OUTPATIENT)
Age: 67
End: 2017-09-29

## 2017-09-29 VITALS
TEMPERATURE: 98 F | SYSTOLIC BLOOD PRESSURE: 131 MMHG | OXYGEN SATURATION: 97 % | RESPIRATION RATE: 18 BRPM | HEART RATE: 61 BPM | DIASTOLIC BLOOD PRESSURE: 81 MMHG

## 2017-09-29 LAB
ANION GAP SERPL CALC-SCNC: 11 MMOL/L — SIGNIFICANT CHANGE UP (ref 5–17)
BUN SERPL-MCNC: 21 MG/DL — SIGNIFICANT CHANGE UP (ref 7–23)
CALCIUM SERPL-MCNC: 9.1 MG/DL — SIGNIFICANT CHANGE UP (ref 8.4–10.5)
CHLORIDE SERPL-SCNC: 103 MMOL/L — SIGNIFICANT CHANGE UP (ref 96–108)
CO2 SERPL-SCNC: 26 MMOL/L — SIGNIFICANT CHANGE UP (ref 22–31)
CREAT SERPL-MCNC: 0.78 MG/DL — SIGNIFICANT CHANGE UP (ref 0.5–1.3)
CULTURE RESULTS: SIGNIFICANT CHANGE UP
CULTURE RESULTS: SIGNIFICANT CHANGE UP
GLUCOSE SERPL-MCNC: 94 MG/DL — SIGNIFICANT CHANGE UP (ref 70–99)
HCT VFR BLD CALC: 38 % — SIGNIFICANT CHANGE UP (ref 34.5–45)
HGB BLD-MCNC: 12.7 G/DL — SIGNIFICANT CHANGE UP (ref 11.5–15.5)
MCHC RBC-ENTMCNC: 31.8 PG — SIGNIFICANT CHANGE UP (ref 27–34)
MCHC RBC-ENTMCNC: 33.4 GM/DL — SIGNIFICANT CHANGE UP (ref 32–36)
MCV RBC AUTO: 95.1 FL — SIGNIFICANT CHANGE UP (ref 80–100)
PLATELET # BLD AUTO: 295 K/UL — SIGNIFICANT CHANGE UP (ref 150–400)
POTASSIUM SERPL-MCNC: 4.5 MMOL/L — SIGNIFICANT CHANGE UP (ref 3.5–5.3)
POTASSIUM SERPL-SCNC: 4.5 MMOL/L — SIGNIFICANT CHANGE UP (ref 3.5–5.3)
RBC # BLD: 4 M/UL — SIGNIFICANT CHANGE UP (ref 3.8–5.2)
RBC # FLD: 11.6 % — SIGNIFICANT CHANGE UP (ref 10.3–14.5)
SODIUM SERPL-SCNC: 140 MMOL/L — SIGNIFICANT CHANGE UP (ref 135–145)
SPECIMEN SOURCE: SIGNIFICANT CHANGE UP
SPECIMEN SOURCE: SIGNIFICANT CHANGE UP
WBC # BLD: 9.8 K/UL — SIGNIFICANT CHANGE UP (ref 3.8–10.5)
WBC # FLD AUTO: 9.8 K/UL — SIGNIFICANT CHANGE UP (ref 3.8–10.5)

## 2017-09-29 RX ORDER — ACETAMINOPHEN 500 MG
2 TABLET ORAL
Qty: 0 | Refills: 0 | DISCHARGE
Start: 2017-09-29

## 2017-09-29 RX ORDER — ACETAMINOPHEN 500 MG
1 TABLET ORAL
Qty: 0 | Refills: 0 | COMMUNITY

## 2017-09-29 RX ADMIN — Medication 100 MILLIGRAM(S): at 06:23

## 2017-09-29 RX ADMIN — Medication 180 MILLIGRAM(S): at 06:28

## 2017-09-29 RX ADMIN — Medication 100 MILLIGRAM(S): at 06:26

## 2017-09-29 RX ADMIN — Medication 100 MILLIGRAM(S): at 13:50

## 2017-09-29 NOTE — DISCHARGE NOTE ADULT - CARE PROVIDER_API CALL
Akbar Millan ([email or web address]), Surgery [facility]  [address] [place] [address]  Phone: [phone]  Fax: [phone]

## 2017-09-29 NOTE — PROGRESS NOTE ADULT - ASSESSMENT
66F with afib on AC, remote breast cancer s/p surgery no chemo , no xrt.  here with trauma about 3 weeks ago [place] 2 courses of antibx abroad  Findings appear C/W hematoma  Role of Abx unclear, would hope to limit.  Even if patient did have an infected hematoma, drainage is the mainstay of therapy, antibiotics are clearly adjunctive.
66F with afib on AC, remote breast cancer s/p surgery no chemo , no xrt.  here with trauma about 3 weeks ago had 2 courses of antibx abroad  With persistent inflammation  Certainly could have component of infection. If hematoma infected, need drainange >> abx.  Possible that all changes related to hemtoma in/of itself.  MRI fortunately w superficial looking changes
67yo F s/p LF incision and drainage of hematoma POD#1  ·	Pt seen and evaluated   ·	noninfectious hematoma evacuated on left foot. No intraop cultures taken per attending  ·	pain controlled  ·	edema and erythema improved  ·	packing replaced in incision site and compressive dressing applied  ·	cont limited weight bearing to left foot and elevate at all times  ·	d/w attending
65yo F s/p LF incision and drainage of hematoma POD#1  ·	Pt seen and evaluated   ·	noninfectious hematoma evacuated on left foot. No intraop cultures taken per attending  ·	pain controlled  ·	edema and erythema improved  ·	compressive dressing applied  ·	no packing  ·	cont limited weight bearing to left foot and elevate at all times  ·	d/w attending
65yo F s/p LF incision and drainage of hematoma POD#1  ·	Pt seen and evaluated   ·	pain controlled  ·	edema and erythema improved  ·	compressive dressing applied. no packing  ·	cont limited weight bearing to left foot and elevate at all times  ·	patient stable for discharge from pod surg standpoint  ·	Rec d/c on Augmentin 7 days  ·	Discharge ppwk completed   ·	d/w attending
66-yo feamel with pmhx of breast ca htn hl migraines afib on rivaroxaban who presents w/ foot wound c/f cellulitis
66F with afib paroxysmal on xarelto, had traumatic injury to right foot with cellulitis  and hematoma  s/p debridement  op report describes hematoma and no pus.  Cultures not sent
66f pmh breast ca htn hl migraines afib on rivaroxaban who presents w/ foot wound c/f cellulitis
67yo F s/p injury 9/9/17 to dorsal midfoot with plantarflexory force LF cellulitis  ·	Pt seen and evaluated   ·	MRI shows large collection dorsal left foot  ·	Pt added on for incision and drainage tonight w/ Dr Millan after 4pm  ·	NPO since 8am  ·	coag labs pending  ·	chest xray ordered for pre op eval  ·	ekg in chart  ·	d/w attending
67yo F s/p injury 9/9/17 to dorsal midfoot with plantarflexory force LF cellulitis  ·	Pt seen and evaluated   ·	cont IV abx cellulitis r/o underlying abscess/hematoma if no improvement in appearance, failed an inconsistent abx regimen while on vacation   ·	awaitng MRI r/o deep abscess/hematoma  ·	cont local wound care  ·	cont elevation to affected limb  ·	d/w attending
66f pmh breast ca htn hl migraines afib on rivaroxaban who presents w/ foot wound c/f cellulitis
66F with afib on AC, remote breast cancer s/p surgery no chemo , no xrt.  here with trauma about 3 weeks ago had 2 courses of antibx abroad  Findings appear C/W hematoma  Role of Abx unclear, would hope to limit.  Even if patient did have an infected hematoma, drainage is the mainstay of therapy, antibiotics are clearly adjunctive.
66-yo feamel with pmhx of breast ca htn hl migraines afib on rivaroxaban who presents w/ foot wound c/f cellulitis

## 2017-09-29 NOTE — PROGRESS NOTE ADULT - PROBLEM SELECTOR PLAN 1
If present rather than just local inflammation from the hematoma, it appears to be today is day 5 of antibx  no fever, normal wbc  can d/c antibx after todays dose.  no
If present rather than just local inflammation from the hematoma, it appears to be resolving nicely  Cont Abx
If present rather than just local inflammation from the hematoma, it appears to be resolving nicely  Hope to limit abx
continue camelia, appreciate ID recs
continue vanc/zosyn, appreciate ID recs  MRI foot shows 5.3 x 1.5 x 5.1 cm heterogeneous collection in the dorsal   subcutaneous tissues overlying the proximal forefoot, likely a hematoma,   as above. No fracture or osteomyelitis.  appreciate podiatry recs, plan for I&D today  doppler US neg  PT  BC NTD
with hematoma s/p drainage and wound  no cultures  no fever, no leukocytosis  no surrounding erythema  would d/c antibx now.  cont local wound care
nonpurulent cellulitis but pt recently hospitalized, can cover for MRSA  apprecoiate podiatry, continue vanc/zosyn  MRI foot  doppler US neg  PT  f/u BC  ID consult
continue camelia, appreciate ID recs, last day is today

## 2017-09-29 NOTE — PROGRESS NOTE ADULT - PROVIDER SPECIALTY LIST ADULT
Infectious Disease
Internal Medicine
Internal Medicine
Podiatry
Internal Medicine
Podiatry
Infectious Disease
Internal Medicine

## 2017-09-29 NOTE — DISCHARGE NOTE ADULT - MEDICATION SUMMARY - MEDICATIONS TO TAKE
I will START or STAY ON the medications listed below when I get home from the hospital:    aspirin 81 mg oral tablet  -- 1 tab(s) by mouth once a day  -- Indication: For Cardiac protetion    acetaminophen 325 mg oral tablet  -- 2 tab(s) by mouth every 6 hours, As needed, Mild Pain (1 - 3)  -- Indication: For pain management    ramipril 10 mg oral capsule  -- 1 cap(s) by mouth once a day  -- Indication: For Hypertension    dilTIAZem 180 mg/24 hours oral capsule, extended release  -- 1 cap(s) by mouth once a day  -- Indication: For Atrial fib    rivaroxaban 20 mg oral tablet  -- 1 tab(s) by mouth once a day (in the evening)  -- Indication: For Atrial fib anticoagulation stroke prevention    atorvastatin 10 mg oral tablet  -- 1 tab(s) by mouth once a day  -- Indication: For Hyperlipidemia    metoprolol succinate 100 mg oral tablet, extended release  -- 1 tab(s) by mouth once a day  -- Indication: For Atrial fib & hypertension    amoxicillin-clavulanate 875 mg-125 mg oral tablet  -- 1 tab(s) by mouth every 12 hours x 7 days - start tonight 9/27  -- Finish all this medication unless otherwise directed by prescriber.  Take with food or milk.    -- Indication: For Cellulitis    Centrum Silver oral tablet  -- 1 tab(s) by mouth once a day  -- Indication: For vitamin supplement    Calcium 500+D  -- 1 tab(s) by mouth once a day  -- Indication: For Calcium supplement

## 2017-09-29 NOTE — DISCHARGE NOTE ADULT - CARE PLAN
Goal:	LF Hematoma  Instructions for follow-up, activity and diet:	- WB Status: Weight bearing as tolerated in surgical shoe  - Dressing: keep dressing clean, dry and intact till follow-up with Dr. Millan  - Antibiotics: Recommend 7 days of Augmentin. Please complete course of antibiotics as prescribed  - Follow-up: Please follow-up within 3-5 days of discharge with Dr. Millan at [phone] Goal:	LF Hematoma  Instructions for follow-up, activity and diet:	- WB Status: Weight bearing as tolerated to heel in surgical shoe  - Dressing: keep dressing clean, dry and intact till follow-up with Dr. Millan  - Antibiotics: Recommend 7 days of Augmentin. Please complete course of antibiotics as prescribed  - Follow-up: Please follow-up within 3-5 days of discharge with Dr. Millan at [phone] Principal Discharge DX:	Cellulitis  Goal:	resolution of infection and healing of wound  Instructions for follow-up, activity and diet:	- WB Status: Weight bearing as tolerated to heel in surgical shoe  - Dressing: keep dressing clean, dry and intact till follow-up with Dr. Millan in 3-5 days  patient has walker  antibiotics w/ Augmentin x 7 days as per podiatry  - Antibiotics: Recommend 7 days of Augmentin. Please complete course of antibiotics as prescribed  - Follow-up: Please follow-up within 3-5 days of discharge with Dr. Millan at [phone]  Secondary Diagnosis:	Afib  Instructions for follow-up, activity and diet:	Atrial fibrillation is the most common heart rhythm problem & has the risk of stroke & heart attack  It helps if you control your blood pressure, not drink more than 1-2 alcohol drinks per day, cut down on caffeine, getting treatment for over active thyroid gland, & getting exercise  Call your doctor if you feel your heart racing or beating unusually, chest tightness or pain, lightheaded, faint, shortness of breath especially with exercise  It is important to take your heart medication as prescribed  You are on Xarelto for anticoagulation  Xarelto/Rivaroxaban is used to thin the blood so clots will not form and to keep existing ones from getting bigger.  Take this medication daily as prescribed by your health care provider.  Take this medication with food to prevent upset stomach.  If you miss a dose call your health care provider or pharmacist right away.  Tell your doctor you use this drug before you have a spinal or epidural procedure  Tell dentists, surgeon, and other doctors that you use this drug.  You may bleed more easily.  Be careful and avoid injury.  Use a soft toothbrush and an electric razor.  Secondary Diagnosis:	Hypertension  Instructions for follow-up, activity and diet:	Follow up with your medical doctor to establish long term blood pressure treatment goals.  follow up with primary care physician within one week

## 2017-09-29 NOTE — DISCHARGE NOTE ADULT - ADDITIONAL INSTRUCTIONS
follow up with primary care physician within one week after discharge  follow up with podiatry Dr. Stoll in 3-5 days for dressing and wound evaluation

## 2017-09-29 NOTE — DISCHARGE NOTE ADULT - MEDICATION SUMMARY - MEDICATIONS TO STOP TAKING
I will STOP taking the medications listed below when I get home from the hospital:    Tylenol 500 mg oral tablet  -- 1 tab(s) by mouth , As Needed for migraine

## 2017-09-29 NOTE — PROGRESS NOTE ADULT - SUBJECTIVE AND OBJECTIVE BOX
[facility], Division of Infectious Diseases  GRABIEL Dias A. Lee    Name: DEWAYNE PROCTOR  Age: 66y  Gender: Female  MRN: [id number]    Interval History--  Notes reviewed. Feels ok. No diarrhea. No urinary symptoms. Pain not an issue. S/P dressing change.     Brief Op note reviewed. Dx= abscess, but findings= hematoma. Per today's podaitry note:  	67yo F s/p LF incision and drainage of hematoma POD#1  ·	Pt seen and evaluated   		noninfectious hematoma evacuated on left foot. No intraop cultures taken per attending    Past Medical History--  Uterine polyp  Vertigo  Migraines  CA - breast cancer  Elevated cholesterol  Hx of mitral valve prolapse  Hypertension  Anomaly of diaphragm  S/P D&C  S/P T&A (status post tonsillectomy and adenoidectomy)  History of bilateral mastectomy      For details regarding the patient's social history, family history, and other miscellaneous elements, please refer the initial infectious diseases consultation and/or the admitting history and physical examination for this admission.    Allergies    No Known Allergies    Intolerances        Medications--  Antibiotics:  ceFAZolin   IVPB 1000 milliGRAM(s) IV Intermittent every 8 hours    Immunologic:  influenza   Vaccine 0.5 milliLiter(s) IntraMuscular once    Other:  acetaminophen  IVPB.  aspirin  chewable  rivaroxaban  acetaminophen   Tablet. PRN  lisinopril  atorvastatin  metoprolol succinate ER  diltiazem   CD  sodium chloride 0.9%.  acetaminophen   Tablet. PRN  oxyCODONE    5 mG/acetaminophen 325 mG PRN  morphine  - Injectable PRN  ondansetron Injectable PRN      Review of Systems--  Review of systems otherwise unchanged except as previously noted.    Physical Examination--  Vital Signs: T(F): 98.1 (09-27-17 @ 10:39), Max: 98.4 (09-26-17 @ 22:30)  HR: 71 (09-27-17 @ 10:39)  BP: 111/70 (09-27-17 @ 10:39)  RR: 18 (09-27-17 @ 10:39)  SpO2: 96% (09-27-17 @ 10:39)  Wt(kg): --  General: Nontoxic-appearing Female in no acute distress.  HEENT: AT/NC. PERRL. EOMI. Anicteric. Conjunctiva pink and moist. Oropharynx clear. Dentition fair.  Neck: Not rigid. No sense of mass.  Nodes: None palpable.  Lungs: Clear bilaterally without rales, wheezing or ronchi  Heart: Regular rate and rhythm. No Murmur. No rub. No gallop. No palpable thrill.  Abdomen: Bowel sounds present and normoactive. Soft. S. obese. Nondistended. Nontender. No sense of mass. No organomegaly.  Back: No spinal tenderness. No costovertebral angle tenderness.   Extremities: No cyanosis or clubbing. 1+ LE edema.  R foot wrapped w ACE.  Skin: Warm. Dry. Good turgor. No rash. No vasculitic stigmata.  Psychiatric: Appropriate affect and mood for situation.       Laboratory Studies--  CBC                        12.9   8.48  )-----------( 333      ( 26 Sep 2017 09:23 )             39.5       Chemistries  09-27    137  |  102  |  15  ----------------------------<  138<H>  5.2   |  23  |  0.86    Ca    9.5      27 Sep 2017 08:54        Culture Data  None
Patient is a 66y old  Female who presents with a chief complaint of cellulitis (24 Sep 2017 18:44)       INTERVAL HPI/OVERNIGHT EVENTS:  Patient seen and evaluated at bedside.  Pt is resting comfortable in NAD. Denies N/V/F/C.  Pain rated at 0/10    Allergies    No Known Allergies    Intolerances        Vital Signs Last 24 Hrs  T(C): 36.7 (27 Sep 2017 06:34), Max: 36.9 (26 Sep 2017 22:30)  T(F): 98 (27 Sep 2017 06:34), Max: 98.4 (26 Sep 2017 22:30)  HR: 76 (27 Sep 2017 06:34) (59 - 76)  BP: 135/84 (27 Sep 2017 06:34) (106/70 - 163/88)  BP(mean): 89 (26 Sep 2017 19:00) (89 - 89)  RR: 18 (27 Sep 2017 06:34) (14 - 18)  SpO2: 95% (27 Sep 2017 06:34) (93% - 99%)    LABS:                        12.9   8.48  )-----------( 333      ( 26 Sep 2017 09:23 )             39.5     09-26    140  |  102  |  12  ----------------------------<  93  4.8   |  27  |  0.68    Ca    9.8      26 Sep 2017 09:25      PT/INR - ( 26 Sep 2017 09:34 )   PT: 16.6 sec;   INR: 1.51 ratio         PTT - ( 26 Sep 2017 09:34 )  PTT:41.7 sec    CAPILLARY BLOOD GLUCOSE          Lower Extremity Physical Exam:  Vascular: DP/PT 2/4, B/L, CFT <3 seconds B/L, Temperature gradient WNL.   Neuro: Epicritic sensation intact to the level of digits, B/L.  Musculoskeletal/Ortho: pain on palpation of dorsal midfoot  Skin: dorsal midfoot incision with sutures in place, sanginous drainage, marked improvement in edema and erythema to left foot, skin lines returning    RADIOLOGY & ADDITIONAL TESTS:
[facility], Division of Infectious Diseases  GRABIEL Dias A. Lee    Name: DEWAYNE PROCTOR  Age: 66y  Gender: Female  MRN: [id number]    Interval History--  Notes reviewed. Awaitin I&D in OR. C/O L hypothenar eminence numbness. Present on R previously, but that resolved.   No other complaints.    Past Medical History--  Uterine polyp  Vertigo  Migraines  CA - breast cancer  Elevated cholesterol  Hx of mitral valve prolapse  Hypertension  Anomaly of diaphragm  S/P D&C  S/P T&A (status post tonsillectomy and adenoidectomy)  History of bilateral mastectomy      For details regarding the patient's social history, family history, and other miscellaneous elements, please refer the initial infectious diseases consultation and/or the admitting history and physical examination for this admission.    Allergies    No Known Allergies    Intolerances        Medications--  Antibiotics:  ceFAZolin   IVPB 1000 milliGRAM(s) IV Intermittent every 8 hours    Immunologic:  influenza   Vaccine 0.5 milliLiter(s) IntraMuscular once    Other:  acetaminophen  IVPB.  aspirin  chewable  lisinopril  rivaroxaban  atorvastatin  metoprolol succinate ER  diltiazem   CD  acetaminophen   Tablet. PRN      Review of Systems--  A 10-point review of systems was obtained.     Pertinent positives and negatives--  Constitutional: No fevers. No Chills. No Rigors.   Cardiovascular: No chest pain. No palpitations.  Respiratory: No shortness of breath. No cough.  Gastrointestinal: No nausea or vomiting. No diarrhea or constipation.   Psychiatric: Pleasant. Appropriate affect.    Review of systems otherwise negative except as previously noted.    Physical Examination--  Vital Signs: T(F): 97.9 (09-26-17 @ 13:52), Max: 98.3 (09-26-17 @ 09:22)  HR: 59 (09-26-17 @ 13:52)  BP: 122/77 (09-26-17 @ 13:52)  RR: 18 (09-26-17 @ 13:52)  SpO2: 97% (09-26-17 @ 13:52)  Wt(kg): --  General: Nontoxic-appearing Female in no acute distress.  HEENT: AT/NC. PERRL. EOMI. Anicteric. Conjunctiva pink and moist. Oropharynx clear. Dentition fair.  Neck: Not rigid. No sense of mass.  Nodes: None palpable.  Lungs: Clear bilaterally without rales, wheezing or ronchi  Heart: Regular rate and rhythm. No Murmur. No rub. No gallop. No palpable thrill.  Abdomen: Bowel sounds present and normoactive. Soft. S. obese. Nondistended. Nontender. No sense of mass. No organomegaly.  Back: No spinal tenderness. No costovertebral angle tenderness.   Extremities: No cyanosis or clubbing. 1+ LLE edema, 2+ RLE edema. Dorsum of foot with abrasion and fluctuance. Mild tenderness. Minimal erythema.  Skin: Warm. Dry. Good turgor. No rash. No vasculitic stigmata.  Psychiatric: Appropriate affect and mood for situation.       Laboratory Studies--  CBC                        12.9   8.48  )-----------( 333      ( 26 Sep 2017 09:23 )             39.5       Chemistries  09-26    140  |  102  |  12  ----------------------------<  93  4.8   |  27  |  0.68    Ca    9.8      26 Sep 2017 09:25    < from: MRI Foot w/wo Cont, Left (09.25.17 @ 19:45) >    EXAM:  MRI FOOT W - W O CONT LT                            PROCEDURE DATE:  09/25/2017            INTERPRETATION:  EXAMINATION: MRI left foot with and without contrast    CLINICAL INFORMATION: Cellulitis. Status post trauma.    TECHNIQUE: Multiplanar, multisequential MR imaging was performed. This   includes T1-weighted images after the administration of 10 mL of   intravenous gadolinium. Images were obtained from the level of the   navicular through the distal foot.    FINDINGS: Within the dorsal subcutaneous tissues overlying the metatarsal   bases there is a 5.3 x 1.5 x 5.1 cm heterogeneous collection with areas   of high and low T1 and T2 signal intensity and with a peripheral rim of   enhancement, likely a hematoma, although, superimposed infection cannot   be excluded. There is adjacent ill-defined edema and reticulation in the   dorsal subcutaneous tissues.    There is no fracture. There are no areas of marrow signal alteration to   suggest osteomyelitis. There is mild first metatarsophalangeal joint   arthrosis. There are no advanced arthritic changes. There are no acute   tendon tears. 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    IMPRESSION: 5.3 x 1.5 x 5.1 cm heterogeneous collection in the dorsal   subcutaneous tissues overlying the proximal forefoot, likely a hematoma,   as above. Superimposed infection cannot be excluded.    No fracture or osteomyelitis.  SPENSER GANDHI M.D., ATTENDING RADIOLOGIST  This document has been electronically signed. Sep 26 2017  9:18AM  < end of copied text >      Culture Data  Culture - Blood (09.24.17 @ 21:53)    Specimen Source: .Blood Blood-Venous    Culture Results:   No growth to date.    Culture - Blood (09.24.17 @ 21:53)    Specimen Source: .Blood Blood-Peripheral    Culture Results:   No growth to date.
[facility], Division of Infectious Diseases  GRABIEL Dias A. Lee  [phone]    Name: DEWAYNE PROCTOR  Age: 66y  Gender: Female  MRN: [id number]    Interval History--  Notes reviewed  Pt able to ambulate and put pressure on leg.  No fever and no events overnight  Past Medical History--  Uterine polyp  Vertigo  Migraines  CA - breast cancer  Elevated cholesterol  Hx of mitral valve prolapse  Hypertension  Anomaly of diaphragm  S/P D&C  S/P T&A (status post tonsillectomy and adenoidectomy)  History of bilateral mastectomy      For details regarding the patient's social history, family history, and other miscellaneous elements, please refer the initial infectious diseases consultation and/or the admitting history and physical examination for this admission.    Allergies    No Known Allergies    Intolerances        Medications--  Antibiotics:  ceFAZolin   IVPB 1000 milliGRAM(s) IV Intermittent every 8 hours    Immunologic:  influenza   Vaccine 0.5 milliLiter(s) IntraMuscular once    Other:  acetaminophen  IVPB.  aspirin  chewable  rivaroxaban  acetaminophen   Tablet. PRN  lisinopril  atorvastatin  metoprolol succinate ER  diltiazem   CD  sodium chloride 0.9%.  acetaminophen   Tablet. PRN  oxyCODONE    5 mG/acetaminophen 325 mG PRN  ondansetron Injectable PRN      Review of Systems--  A 10-point review of systems was obtained.     Pertinent positives and negatives--  Constitutional: No fevers. No Chills. No Rigors.   Cardiovascular: No chest pain. No palpitations.  Respiratory: No shortness of breath. No cough.  Gastrointestinal: No nausea or vomiting. No diarrhea or constipation.   Psychiatric: no anxiety, no depression    Review of systems otherwise negative except as previously noted.    Physical Examination--  Vital Signs: T(F): 97.4 (09-29-17 @ 09:14), Max: 98.2 (09-28-17 @ 22:04)  HR: 60 (09-29-17 @ 09:14)  BP: 131/74 (09-29-17 @ 09:14)  RR: 18 (09-29-17 @ 09:14)  SpO2: 98% (09-29-17 @ 09:14)  Wt(kg): --  General: Nontoxic-appearing Female in no acute distress.  HEENT: AT/NC. PERRL. EOMI. Anicteric. Conjunctiva pink and moist. Oropharynx clear. Dentition fair.  Neck: Not rigid. No sense of mass.  Nodes: None palpable.  Lungs: Clear bilaterally without rales, wheezing or rhonchi  Heart: Regular rate and rhythm. No Murmur. No rub. No gallop. No palpable thrill.  Abdomen: Bowel sounds present and normoactive. Soft. Nondistended. Nontender. No sense of mass. No organomegaly.  Back: No spinal tenderness. No costovertebral angle tenderness.   Extremities: No cyanosis or clubbing. No edema.   Skin: Warm. Dry. Good turgor. No rash. No vasculitic stigmata.  Psychiatric: Appropriate affect and mood for situation.         Laboratory Studies--  CBC                        12.7   9.8   )-----------( 295      ( 29 Sep 2017 07:16 )             38.0       Chemistries  09-29    140  |  103  |  21  ----------------------------<  94  4.5   |  26  |  0.78    Ca    9.1      29 Sep 2017 07:16        Culture Data    Culture - Blood (09.24.17 @ 21:53)    Specimen Source: .Blood Blood-Venous    Culture Results:   No growth to date.
No acute SOB or CP; pain bearable without need for frequent pain meds.  Vital Signs Last 24 Hrs  T(C): 36.6 (27 Sep 2017 14:31), Max: 36.9 (26 Sep 2017 22:30)  T(F): 97.9 (27 Sep 2017 14:31), Max: 98.4 (26 Sep 2017 22:30)  HR: 71 (27 Sep 2017 14:31) (59 - 76)  BP: 104/64 (27 Sep 2017 14:31) (104/64 - 146/77)  BP(mean): 89 (26 Sep 2017 19:00) (89 - 89)  RR: 18 (27 Sep 2017 14:31) (14 - 18)  SpO2: 96% (27 Sep 2017 14:31) (93% - 99%)    GENERAL: NAD, AAOx3  HEAD:  Atraumatic, Normocephalic  EYES: EOMI  NECK: Supple, No JVD  CHEST/LUNG: CTABL, No wheeze  HEART: Regular rate and rhythm; no murmur  ABDOMEN: Soft, Nontender, Nondistended; Bowel sounds present  EXTREMITIES:  left foot swollen, left foot dressing c/d/i  NEURO: No focal deficits    LABS:                        12.9   8.48  )-----------( 333      ( 26 Sep 2017 09:23 )             39.5     09-27    137  |  102  |  15  ----------------------------<  138<H>  5.2   |  23  |  0.86    Ca    9.5      27 Sep 2017 08:54      PT/INR - ( 26 Sep 2017 09:34 )   PT: 16.6 sec;   INR: 1.51 ratio         PTT - ( 26 Sep 2017 09:34 )  PTT:41.7 sec  CAPILLARY BLOOD GLUCOSE
Patient is a 66y old  Female who presents with a chief complaint of cellulitis (24 Sep 2017 18:44)       INTERVAL HPI/OVERNIGHT EVENTS:  Patient seen and evaluated at bedside.  Pt is resting comfortable in NAD. Denies N/V/F/C.  Pain controlled    Allergies    No Known Allergies    Intolerances        Vital Signs Last 24 Hrs  T(C): 36.7 (25 Sep 2017 09:25), Max: 37.2 (24 Sep 2017 13:10)  T(F): 98.1 (25 Sep 2017 09:25), Max: 99 (24 Sep 2017 13:10)  HR: 66 (25 Sep 2017 09:25) (64 - 74)  BP: 134/85 (25 Sep 2017 09:25) (124/83 - 147/97)  BP(mean): --  RR: 18 (25 Sep 2017 09:25) (16 - 20)  SpO2: 97% (25 Sep 2017 09:25) (95% - 100%)    LABS:                        12.9   8.23  )-----------( 366      ( 25 Sep 2017 09:00 )             39.8     09-25    138  |  102  |  16  ----------------------------<  91  4.7   |  23  |  0.85    Ca    10.2      25 Sep 2017 08:57    TPro  8.0  /  Alb  4.2  /  TBili  0.4  /  DBili  x   /  AST  17  /  ALT  13  /  AlkPhos  80  09-24    PT/INR - ( 24 Sep 2017 15:07 )   PT: 15.6 sec;   INR: 1.42 ratio         PTT - ( 24 Sep 2017 15:07 )  PTT:37.2 sec    CAPILLARY BLOOD GLUCOSE          Lower Extremity Physical Exam:  Vascular: DP/PT 2/4, B/L, CFT <3 seconds B/L, Temperature gradient increased to LF WNL to RF.   Neuro: Epicritic sensation intact to the level of digits, B/L.  Musculoskeletal/Ortho: pain on palpation of dorsal midfoot, at area of CFL ATFL LF  Skin: improving erythema to dorsal midfoot, lateral midfoot extending to lateral ankle, ecchymosis plantar medial midfoot to ankle    RADIOLOGY & ADDITIONAL TESTS:  < from: Xray Foot AP + Lateral + Oblique, Left (09.24.17 @ 16:28) >  EXAM:  ANKLE LEFT (MINIMUM 3 VIEWS)                          EXAM:  FOOT COMPLETE LEFT (MIN 3 VIEWS)                            PROCEDURE DATE:  09/24/2017            INTERPRETATION:  CLINICAL INFORMATION: Left foot pain and swelling    TECHNIQUE: AP, oblique, and lateral views of the left foot    COMPARISON: None    Left foot findings:    Extensive soft tissue edema of the dorsum of the left foot. No evidence   of subcutaneous gas. No acute fractures or dislocations.    Left ankle findings:    Soft tissue edema of the soft tissues adjacent to the medial malleolus.   No evidence of subcutaneous gas. No acute fractures or dislocations.    Impression:    Extensive soft tissue edema of the dorsum of the left foot and soft   tissues adjacent tothe left medial malleolus. No subcutaneous gas.                DENISHA GAO M.D., RADIOLOGY RESIDENT  This document has been electronically signed.  MARYJO SOLARES M.D., ATTENDING RADIOLOGIST  This document has been electronically signed. Sep 25 2017  9:08AM    < end of copied text >
Patient is a 66y old  Female who presents with a chief complaint of cellulitis (24 Sep 2017 18:44)       INTERVAL HPI/OVERNIGHT EVENTS:  Patient seen and evaluated at bedside.  Pt is resting comfortable in NAD. Denies N/V/F/C.  Pain controlled    Allergies    No Known Allergies    Intolerances        Vital Signs Last 24 Hrs  T(C): 36.8 (26 Sep 2017 05:33), Max: 36.8 (25 Sep 2017 14:35)  T(F): 98.2 (26 Sep 2017 05:33), Max: 98.2 (25 Sep 2017 14:35)  HR: 64 (26 Sep 2017 05:33) (62 - 74)  BP: 134/89 (26 Sep 2017 05:33) (118/75 - 135/86)  BP(mean): --  RR: 18 (26 Sep 2017 05:33) (18 - 18)  SpO2: 97% (26 Sep 2017 05:33) (95% - 97%)    LABS:                        12.9   8.23  )-----------( 366      ( 25 Sep 2017 09:00 )             39.8     09-25    138  |  102  |  16  ----------------------------<  91  4.7   |  23  |  0.85    Ca    10.2      25 Sep 2017 08:57    TPro  8.0  /  Alb  4.2  /  TBili  0.4  /  DBili  x   /  AST  17  /  ALT  13  /  AlkPhos  80  09-24    PT/INR - ( 24 Sep 2017 15:07 )   PT: 15.6 sec;   INR: 1.42 ratio         PTT - ( 24 Sep 2017 15:07 )  PTT:37.2 sec    CAPILLARY BLOOD GLUCOSE          Lower Extremity Physical Exam:  Vascular: DP/PT 2/4, B/L, CFT <3 seconds B/L, Temperature gradient increased to LF WNL to RF.   Neuro: Epicritic sensation intact to the level of digits, B/L.  Musculoskeletal/Ortho: pain on palpation of dorsal midfoot, at area of CFL ATFL LF  Skin: improving erythema to dorsal midfoot, lateral midfoot extending to lateral ankle, ecchymosis plantar medial midfoot to ankle    RADIOLOGY & ADDITIONAL TESTS:  MRI: shows large fluid collection dorsal left foot
Patient is a 66y old  Female who presents with a chief complaint of cellulitis (24 Sep 2017 18:44)       INTERVAL HPI/OVERNIGHT EVENTS:  Patient seen and evaluated at bedside.  Pt is resting comfortable in NAD. Denies N/V/F/C.  Pain rated at 0/10    Allergies    No Known Allergies    Intolerances        Vital Signs Last 24 Hrs  T(C): 36.5 (28 Sep 2017 10:28), Max: 36.9 (27 Sep 2017 21:59)  T(F): 97.7 (28 Sep 2017 10:28), Max: 98.5 (27 Sep 2017 21:59)  HR: 66 (28 Sep 2017 10:28) (61 - 71)  BP: 99/60 (28 Sep 2017 10:28) (99/60 - 118/75)  BP(mean): --  RR: 18 (28 Sep 2017 10:28) (18 - 18)  SpO2: 97% (28 Sep 2017 10:28) (93% - 97%)    LABS:                        13.1   7.90  )-----------( 362      ( 27 Sep 2017 07:47 )             40.2     09-27    137  |  102  |  15  ----------------------------<  138<H>  5.2   |  23  |  0.86    Ca    9.5      27 Sep 2017 08:54          CAPILLARY BLOOD GLUCOSE          Lower Extremity Physical Exam:  Vascular: DP/PT 2/4, B/L, CFT <3 seconds B/L, Temperature gradient WNL.   Neuro: Epicritic sensation intact to the level of digits, B/L.  Musculoskeletal/Ortho: pain on palpation of dorsal midfoot  Skin: dorsal midfoot incision with sutures in place, serosanginous drainage, marked improvement in edema and erythema to left foot, skin lines returning    RADIOLOGY & ADDITIONAL TESTS:
Patient is a 66y old  Female who presents with a chief complaint of cellulitis (24 Sep 2017 18:44)      SUBJECTIVE / OVERNIGHT EVENTS:    Patient seen and examined.  plan for I&D today with podiatry. denies complaints. afebrile. no cp sob.    Vital Signs Last 24 Hrs  T(C): 36.8 (26 Sep 2017 09:22), Max: 36.8 (25 Sep 2017 14:35)  T(F): 98.3 (26 Sep 2017 09:22), Max: 98.3 (26 Sep 2017 09:22)  HR: 62 (26 Sep 2017 09:22) (62 - 74)  BP: 125/82 (26 Sep 2017 09:22) (118/75 - 135/86)  BP(mean): --  RR: 18 (26 Sep 2017 09:22) (18 - 18)  SpO2: 96% (26 Sep 2017 09:22) (95% - 97%)  I&O's Summary    25 Sep 2017 07:01  -  26 Sep 2017 07:00  --------------------------------------------------------  IN: 1180 mL / OUT: 3100 mL / NET: -1920 mL    26 Sep 2017 07:01  -  26 Sep 2017 12:09  --------------------------------------------------------  IN: 0 mL / OUT: 1300 mL / NET: -1300 mL    PE:  GENERAL: NAD, AAOx3  HEAD:  Atraumatic, Normocephalic  EYES: EOMI, PERRLA, conjunctiva and sclera clear  NECK: Supple, No JVD  CHEST/LUNG: CTABL, No wheeze  HEART: Regular rate and rhythm; no murmur  ABDOMEN: Soft, Nontender, Nondistended; Bowel sounds present  EXTREMITIES:  2+ Peripheral Pulses, left foot swollen, 1 dime size ulcer on plantar right foot, some echymosis around calcaneous  NEURO: No focal deficits      LABS:                        12.9   8.48  )-----------( 333      ( 26 Sep 2017 09:23 )             39.5     09-26    140  |  102  |  12  ----------------------------<  93  4.8   |  27  |  0.68    Ca    9.8      26 Sep 2017 09:25    TPro  8.0  /  Alb  4.2  /  TBili  0.4  /  DBili  x   /  AST  17  /  ALT  13  /  AlkPhos  80  09-24    PT/INR - ( 26 Sep 2017 09:34 )   PT: 16.6 sec;   INR: 1.51 ratio         PTT - ( 26 Sep 2017 09:34 )  PTT:41.7 sec  CAPILLARY BLOOD GLUCOSE                RADIOLOGY & ADDITIONAL TESTS:    Imaging Personally Reviewed:  [x] YES  [ ] NO    Consultant(s) Notes Reviewed:  [x] YES  [ ] NO    MEDICATIONS  (STANDING):  acetaminophen  IVPB. 1000 milliGRAM(s) IV Intermittent once  aspirin  chewable 81 milliGRAM(s) Oral at bedtime  lisinopril 40 milliGRAM(s) Oral at bedtime  rivaroxaban 20 milliGRAM(s) Oral every 24 hours  atorvastatin 10 milliGRAM(s) Oral at bedtime  metoprolol succinate  milliGRAM(s) Oral daily  diltiazem    milliGRAM(s) Oral daily  influenza   Vaccine 0.5 milliLiter(s) IntraMuscular once  ceFAZolin   IVPB 1000 milliGRAM(s) IV Intermittent every 8 hours    MEDICATIONS  (PRN):  acetaminophen   Tablet. 650 milliGRAM(s) Oral every 6 hours PRN Mild Pain (1 - 3)      Care Discussed with Consultants/Other Providers [x] YES  [ ] NO    HEALTH ISSUES - PROBLEM Dx:  Elevated cholesterol: Elevated cholesterol  Hypertension: Hypertension  Afib: Afib  Cellulitis: Cellulitis
Patient is a 66y old  Female who presents with a chief complaint of cellulitis (29 Sep 2017 08:11)       INTERVAL HPI/OVERNIGHT EVENTS:  Patient seen and evaluated at bedside.  Pt is resting comfortable in NAD. Denies N/V/F/C.  Pain rated at 0/10    Allergies    No Known Allergies    Intolerances    Vital Signs Last 24 Hrs  T(C): 36.7 (29 Sep 2017 00:48), Max: 36.8 (28 Sep 2017 22:04)  T(F): 98.1 (29 Sep 2017 00:48), Max: 98.2 (28 Sep 2017 22:04)  HR: 64 (29 Sep 2017 00:48) (59 - 72)  BP: 133/87 (29 Sep 2017 00:48) (99/60 - 133/87)  BP(mean): --  RR: 18 (29 Sep 2017 00:48) (18 - 18)  SpO2: 97% (29 Sep 2017 00:48) (96% - 98%)    LABS:                        12.7   9.8   )-----------( 295      ( 29 Sep 2017 07:16 )             38.0     09-29    140  |  103  |  21  ----------------------------<  94  4.5   |  26  |  0.78    Ca    9.1      29 Sep 2017 07:16          CAPILLARY BLOOD GLUCOSE    Lower Extremity Physical Exam:  Vascular: DP/PT 2/4, B/L, CFT <3 seconds B/L, Temperature gradient WNL.   Neuro: Epicritic sensation intact to the level of digits, B/L.  Musculoskeletal/Ortho: pain on palpation of dorsal midfoot  Skin: dorsal midfoot incision with sutures in place, no serosanginous drainage, marked improvement in edema and erythema to left foot, skin lines returning    RADIOLOGY & ADDITIONAL TESTS:
Patient is a 66y old  Female who presents with a chief complaint of cellulitis (24 Sep 2017 18:44)      SUBJECTIVE / OVERNIGHT EVENTS:    Patient seen and examined. denies left foot pain. recently on cruise to [place] / [place], trauma to left foot on the bus, described as swollen and erythematous. received oral antibiotics on the cruise. went to hospital in [facility]. Received IV antibiotics and discharged. flew home and went to [facility] clinic yesterday and presented to ER. denies fevers, chills, cp, sob, n/v/d. is able to ambulate.      Vital Signs Last 24 Hrs  T(C): 36.9 (25 Sep 2017 05:48), Max: 37.2 (24 Sep 2017 13:10)  T(F): 98.4 (25 Sep 2017 05:48), Max: 99 (24 Sep 2017 13:10)  HR: 68 (25 Sep 2017 05:48) (64 - 74)  BP: 140/92 (25 Sep 2017 05:48) (124/83 - 147/97)  BP(mean): --  RR: 18 (25 Sep 2017 05:48) (16 - 20)  SpO2: 95% (25 Sep 2017 05:48) (95% - 100%)  I&O's Summary    24 Sep 2017 07:01  -  25 Sep 2017 07:00  --------------------------------------------------------  IN: 590 mL / OUT: 1100 mL / NET: -510 mL        PE:  GENERAL: NAD, AAOx3  HEAD:  Atraumatic, Normocephalic  EYES: EOMI, PERRLA, conjunctiva and sclera clear  NECK: Supple, No JVD  CHEST/LUNG: CTABL, No wheeze  HEART: Regular rate and rhythm; no murmur  ABDOMEN: Soft, Nontender, Nondistended; Bowel sounds present  EXTREMITIES:  2+ Peripheral Pulses, left foot swollen, 1 dime size ulcer on plantar right foot, some echymosis around calcaneous  NEURO: No focal deficits    LABS:                        14.2   12.0  )-----------( 387      ( 24 Sep 2017 15:07 )             44.2     09-24    143  |  103  |  19  ----------------------------<  96  4.2   |  25  |  0.81    Ca    10.2      24 Sep 2017 15:07    TPro  8.0  /  Alb  4.2  /  TBili  0.4  /  DBili  x   /  AST  17  /  ALT  13  /  AlkPhos  80  09-24    PT/INR - ( 24 Sep 2017 15:07 )   PT: 15.6 sec;   INR: 1.42 ratio         PTT - ( 24 Sep 2017 15:07 )  PTT:37.2 sec  CAPILLARY BLOOD GLUCOSE                RADIOLOGY & ADDITIONAL TESTS:    Imaging Personally Reviewed:  [x] YES  [ ] NO    Consultant(s) Notes Reviewed:  [x] YES  [ ] NO    MEDICATIONS  (STANDING):  acetaminophen  IVPB. 1000 milliGRAM(s) IV Intermittent once  vancomycin  IVPB 1250 milliGRAM(s) IV Intermittent every 12 hours  aspirin  chewable 81 milliGRAM(s) Oral at bedtime  lisinopril 40 milliGRAM(s) Oral at bedtime  rivaroxaban 20 milliGRAM(s) Oral every 24 hours  atorvastatin 10 milliGRAM(s) Oral at bedtime  metoprolol succinate  milliGRAM(s) Oral daily  diltiazem    milliGRAM(s) Oral daily  piperacillin/tazobactam IVPB. 3.375 Gram(s) IV Intermittent every 8 hours  influenza   Vaccine 0.5 milliLiter(s) IntraMuscular once    MEDICATIONS  (PRN):      Care Discussed with Consultants/Other Providers [x] YES  [ ] NO    HEALTH ISSUES - PROBLEM Dx:  Elevated cholesterol: Elevated cholesterol  Hypertension: Hypertension  Afib: Afib  Cellulitis: Cellulitis
[facility], Division of Infectious Diseases  GRABIEL Dias A. Lee  [phone]    Name: DEWAYNE PROCTOR  Age: 66y  Gender: Female  MRN: [id number]    Interval History--  Notes reviewed  offers no complaints  Past Medical History--  Uterine polyp  Vertigo  Migraines  CA - breast cancer  Elevated cholesterol  Hx of mitral valve prolapse  Hypertension  Anomaly of diaphragm  S/P D&C  S/P T&A (status post tonsillectomy and adenoidectomy)  History of bilateral mastectomy      For details regarding the patient's social history, family history, and other miscellaneous elements, please refer the initial infectious diseases consultation and/or the admitting history and physical examination for this admission.    Allergies    No Known Allergies    Intolerances        Medications--  Antibiotics:  ceFAZolin   IVPB 1000 milliGRAM(s) IV Intermittent every 8 hours    Immunologic:  influenza   Vaccine 0.5 milliLiter(s) IntraMuscular once    Other:  acetaminophen  IVPB.  aspirin  chewable  rivaroxaban  acetaminophen   Tablet. PRN  lisinopril  atorvastatin  metoprolol succinate ER  diltiazem   CD  sodium chloride 0.9%.  acetaminophen   Tablet. PRN  oxyCODONE    5 mG/acetaminophen 325 mG PRN  morphine  - Injectable PRN  ondansetron Injectable PRN      Review of Systems--  A 10-point review of systems was obtained.     Pertinent positives and negatives--  Constitutional: No fevers. No Chills. No Rigors.   Cardiovascular: No chest pain. No palpitations.  Respiratory: No shortness of breath. No cough.  Gastrointestinal: No nausea or vomiting. No diarrhea or constipation.   Psychiatric: no anxiety, no depression    Review of systems otherwise negative except as previously noted.    Physical Examination--  Vital Signs: T(F): 97.7 (09-28-17 @ 10:28), Max: 98.5 (09-27-17 @ 21:59)  HR: 66 (09-28-17 @ 10:28)  BP: 99/60 (09-28-17 @ 10:28)  RR: 18 (09-28-17 @ 10:28)  SpO2: 97% (09-28-17 @ 10:28)  Wt(kg): --  General: Nontoxic-appearing Female in no acute distress.  HEENT: AT/NC. Anicteric. Conjunctiva pink and moist. Oropharynx clear. Dentition fair.  Neck: Not rigid. No sense of mass.  Nodes: None palpable.  Lungs: Clear bilaterally without rales, wheezing or rhonchi  Heart: Regular rate and rhythm. No Murmur. No rub. No gallop. No palpable thrill.  Abdomen: Bowel sounds present and normoactive. Soft. Nondistended. Nontender. No sense of mass. No organomegaly.  Extremities: LLE wrapped   Skin: Warm. Dry. Good turgor. No rash. No vasculitic stigmata.  Psychiatric: Appropriate affect and mood for situation.         Laboratory Studies--  CBC                        13.1   7.90  )-----------( 362      ( 27 Sep 2017 07:47 )             40.2       Chemistries  09-27    137  |  102  |  15  ----------------------------<  138<H>  5.2   |  23  |  0.86    Ca    9.5      27 Sep 2017 08:54        Culture DataCulture - Blood (09.24.17 @ 21:53)    Specimen Source: .Blood Blood-Venous    Culture Results:   No growth to date.
Still doing well, no new symptoms    Vital Signs Last 24 Hrs  T(C): 36.5 (28 Sep 2017 10:28), Max: 36.9 (27 Sep 2017 21:59)  T(F): 97.7 (28 Sep 2017 10:28), Max: 98.5 (27 Sep 2017 21:59)  HR: 66 (28 Sep 2017 10:28) (61 - 71)  BP: 99/60 (28 Sep 2017 10:28) (99/60 - 118/75)  BP(mean): --  RR: 18 (28 Sep 2017 10:28) (18 - 18)  SpO2: 97% (28 Sep 2017 10:28) (93% - 97%)    GENERAL: NAD, AAOx3  HEAD:  Atraumatic, Normocephalic  EYES: EOMI  NECK: Supple, No JVD  CHEST/LUNG: CTABL, No wheeze  HEART: Regular rate and rhythm; no murmur  ABDOMEN: Soft, Nontender, Nondistended; Bowel sounds present  EXTREMITIES:  left foot swollen, left foot dressing c/d/i  NEURO: No focal deficits    LABS:                        13.1   7.90  )-----------( 362      ( 27 Sep 2017 07:47 )             40.2     09-27    137  |  102  |  15  ----------------------------<  138<H>  5.2   |  23  |  0.86    Ca    9.5      27 Sep 2017 08:54        CAPILLARY BLOOD GLUCOSE

## 2017-09-29 NOTE — DISCHARGE NOTE ADULT - PLAN OF CARE
LF Hematoma - WB Status: Weight bearing as tolerated in surgical shoe  - Dressing: keep dressing clean, dry and intact till follow-up with Dr. Millan  - Antibiotics: Recommend 7 days of Augmentin. Please complete course of antibiotics as prescribed  - Follow-up: Please follow-up within 3-5 days of discharge with Dr. Millan at [phone] - WB Status: Weight bearing as tolerated to heel in surgical shoe  - Dressing: keep dressing clean, dry and intact till follow-up with Dr. Millan  - Antibiotics: Recommend 7 days of Augmentin. Please complete course of antibiotics as prescribed  - Follow-up: Please follow-up within 3-5 days of discharge with Dr. Millan at [phone] resolution of infection and healing of wound - WB Status: Weight bearing as tolerated to heel in surgical shoe  - Dressing: keep dressing clean, dry and intact till follow-up with Dr. Millan in 3-5 days  patient has walker  antibiotics w/ Augmentin x 7 days as per podiatry  - Antibiotics: Recommend 7 days of Augmentin. Please complete course of antibiotics as prescribed  - Follow-up: Please follow-up within 3-5 days of discharge with Dr. Millan at [phone] Atrial fibrillation is the most common heart rhythm problem & has the risk of stroke & heart attack  It helps if you control your blood pressure, not drink more than 1-2 alcohol drinks per day, cut down on caffeine, getting treatment for over active thyroid gland, & getting exercise  Call your doctor if you feel your heart racing or beating unusually, chest tightness or pain, lightheaded, faint, shortness of breath especially with exercise  It is important to take your heart medication as prescribed  You are on Xarelto for anticoagulation  Xarelto/Rivaroxaban is used to thin the blood so clots will not form and to keep existing ones from getting bigger.  Take this medication daily as prescribed by your health care provider.  Take this medication with food to prevent upset stomach.  If you miss a dose call your health care provider or pharmacist right away.  Tell your doctor you use this drug before you have a spinal or epidural procedure  Tell dentists, surgeon, and other doctors that you use this drug.  You may bleed more easily.  Be careful and avoid injury.  Use a soft toothbrush and an electric razor. Follow up with your medical doctor to establish long term blood pressure treatment goals.  follow up with primary care physician within one week

## 2017-09-29 NOTE — DISCHARGE NOTE ADULT - HOSPITAL COURSE
65yo F s/p LF incision and drainage of hematoma POD#1  ·	Pt seen and evaluated   ·	noninfectious hematoma evacuated on left foot. No intraop cultures taken per attending  ·	pain controlled  ·	edema and erythema improved  ·	compressive dressing applied  ·	no packing  ·	cont limited weight bearing to left foot and elevate at all times  d/w attending 67yo F s/p LF incision and drainage of hematoma POD#1  ·	Pt seen and evaluated   ·	noninfectious hematoma evacuated on left foot. No intraop cultures taken per attending  ·	pain controlled  ·	edema and erythema improved  ·	compressive dressing applied  ·	no packing  ·	cont limited weight bearing to left foot and elevate at all times  d/w attending.

## 2017-09-29 NOTE — DISCHARGE NOTE ADULT - PATIENT PORTAL LINK FT
“You can access the [facility] Patient Portal, offered by [facility], by registering with the following website: [email or web address]”

## 2017-10-06 ENCOUNTER — OUTPATIENT (OUTPATIENT)
Dept: OUTPATIENT SERVICES | Facility: HOSPITAL | Age: 67
LOS: 1 days | End: 2017-10-06
Payer: MEDICARE

## 2017-10-06 ENCOUNTER — APPOINTMENT (OUTPATIENT)
Dept: ULTRASOUND IMAGING | Facility: HOSPITAL | Age: 67
End: 2017-10-06
Payer: MEDICARE

## 2017-10-06 DIAGNOSIS — M79.89 OTHER SPECIFIED SOFT TISSUE DISORDERS: ICD-10-CM

## 2017-10-06 DIAGNOSIS — Z00.8 ENCOUNTER FOR OTHER GENERAL EXAMINATION: ICD-10-CM

## 2017-10-06 PROCEDURE — 93970 EXTREMITY STUDY: CPT

## 2017-10-06 PROCEDURE — 93970 EXTREMITY STUDY: CPT | Mod: 26

## 2017-10-19 PROCEDURE — 93971 EXTREMITY STUDY: CPT

## 2017-10-19 PROCEDURE — 84295 ASSAY OF SERUM SODIUM: CPT

## 2017-10-19 PROCEDURE — 73720 MRI LWR EXTREMITY W/O&W/DYE: CPT

## 2017-10-19 PROCEDURE — 99285 EMERGENCY DEPT VISIT HI MDM: CPT | Mod: 25

## 2017-10-19 PROCEDURE — 85730 THROMBOPLASTIN TIME PARTIAL: CPT

## 2017-10-19 PROCEDURE — 80048 BASIC METABOLIC PNL TOTAL CA: CPT

## 2017-10-19 PROCEDURE — 80053 COMPREHEN METABOLIC PANEL: CPT

## 2017-10-19 PROCEDURE — 96375 TX/PRO/DX INJ NEW DRUG ADDON: CPT

## 2017-10-19 PROCEDURE — 82330 ASSAY OF CALCIUM: CPT

## 2017-10-19 PROCEDURE — 83605 ASSAY OF LACTIC ACID: CPT

## 2017-10-19 PROCEDURE — 73610 X-RAY EXAM OF ANKLE: CPT

## 2017-10-19 PROCEDURE — 85610 PROTHROMBIN TIME: CPT

## 2017-10-19 PROCEDURE — 86850 RBC ANTIBODY SCREEN: CPT

## 2017-10-19 PROCEDURE — 97161 PT EVAL LOW COMPLEX 20 MIN: CPT

## 2017-10-19 PROCEDURE — 82803 BLOOD GASES ANY COMBINATION: CPT

## 2017-10-19 PROCEDURE — 86901 BLOOD TYPING SEROLOGIC RH(D): CPT

## 2017-10-19 PROCEDURE — 85027 COMPLETE CBC AUTOMATED: CPT

## 2017-10-19 PROCEDURE — 96374 THER/PROPH/DIAG INJ IV PUSH: CPT

## 2017-10-19 PROCEDURE — 86900 BLOOD TYPING SEROLOGIC ABO: CPT

## 2017-10-19 PROCEDURE — 87040 BLOOD CULTURE FOR BACTERIA: CPT

## 2017-10-19 PROCEDURE — 71045 X-RAY EXAM CHEST 1 VIEW: CPT

## 2017-10-19 PROCEDURE — 84132 ASSAY OF SERUM POTASSIUM: CPT

## 2017-10-19 PROCEDURE — 82435 ASSAY OF BLOOD CHLORIDE: CPT

## 2017-10-19 PROCEDURE — 88304 TISSUE EXAM BY PATHOLOGIST: CPT

## 2017-10-19 PROCEDURE — 85014 HEMATOCRIT: CPT

## 2017-10-19 PROCEDURE — 73630 X-RAY EXAM OF FOOT: CPT

## 2017-10-19 PROCEDURE — 82947 ASSAY GLUCOSE BLOOD QUANT: CPT

## 2018-02-08 ENCOUNTER — EMERGENCY (EMERGENCY)
Facility: HOSPITAL | Age: 68
LOS: 1 days | Discharge: HOME | End: 2018-02-08
Admitting: EMERGENCY MEDICINE
Payer: MEDICARE

## 2018-02-08 DIAGNOSIS — R55 SYNCOPE AND COLLAPSE: ICD-10-CM

## 2018-02-08 DIAGNOSIS — I10 ESSENTIAL (PRIMARY) HYPERTENSION: ICD-10-CM

## 2018-02-08 DIAGNOSIS — I48.91 UNSPECIFIED ATRIAL FIBRILLATION: ICD-10-CM

## 2018-02-08 DIAGNOSIS — Z79.01 LONG TERM (CURRENT) USE OF ANTICOAGULANTS: ICD-10-CM

## 2018-02-08 DIAGNOSIS — E78.00 PURE HYPERCHOLESTEROLEMIA, UNSPECIFIED: ICD-10-CM

## 2018-02-08 PROCEDURE — 80053 COMPREHEN METABOLIC PANEL: CPT

## 2018-02-08 PROCEDURE — 99284 EMERGENCY DEPT VISIT MOD MDM: CPT | Mod: 25

## 2018-02-08 PROCEDURE — 84484 ASSAY OF TROPONIN QUANT: CPT

## 2018-02-08 PROCEDURE — 85027 COMPLETE CBC AUTOMATED: CPT

## 2018-02-08 PROCEDURE — 83880 ASSAY OF NATRIURETIC PEPTIDE: CPT

## 2018-02-08 PROCEDURE — 93005 ELECTROCARDIOGRAM TRACING: CPT

## 2018-02-08 PROCEDURE — 93010 ELECTROCARDIOGRAM REPORT: CPT

## 2018-02-08 PROCEDURE — 99285 EMERGENCY DEPT VISIT HI MDM: CPT

## 2018-02-08 PROCEDURE — 82550 ASSAY OF CK (CPK): CPT

## 2018-02-08 PROCEDURE — 85379 FIBRIN DEGRADATION QUANT: CPT

## 2018-02-08 PROCEDURE — 85610 PROTHROMBIN TIME: CPT

## 2018-02-08 PROCEDURE — 85730 THROMBOPLASTIN TIME PARTIAL: CPT

## 2018-02-08 PROCEDURE — 36415 COLL VENOUS BLD VENIPUNCTURE: CPT

## 2019-04-29 ENCOUNTER — OUTPATIENT (OUTPATIENT)
Dept: OUTPATIENT SERVICES | Facility: HOSPITAL | Age: 69
LOS: 1 days | End: 2019-04-29
Payer: MEDICARE

## 2019-04-29 ENCOUNTER — APPOINTMENT (OUTPATIENT)
Dept: MRI IMAGING | Facility: HOSPITAL | Age: 69
End: 2019-04-29
Payer: MEDICARE

## 2019-04-29 DIAGNOSIS — Z00.8 ENCOUNTER FOR OTHER GENERAL EXAMINATION: ICD-10-CM

## 2019-04-29 PROCEDURE — 72141 MRI NECK SPINE W/O DYE: CPT

## 2019-04-29 PROCEDURE — 72141 MRI NECK SPINE W/O DYE: CPT | Mod: 26

## 2019-05-15 ENCOUNTER — APPOINTMENT (OUTPATIENT)
Dept: ORTHOPEDIC SURGERY | Facility: CLINIC | Age: 69
End: 2019-05-15
Payer: MEDICARE

## 2019-05-15 VITALS — HEIGHT: 68 IN | BODY MASS INDEX: 32.58 KG/M2 | WEIGHT: 215 LBS

## 2019-05-15 PROCEDURE — 73502 X-RAY EXAM HIP UNI 2-3 VIEWS: CPT

## 2019-05-15 PROCEDURE — 99204 OFFICE O/P NEW MOD 45 MIN: CPT

## 2019-05-15 PROCEDURE — 72100 X-RAY EXAM L-S SPINE 2/3 VWS: CPT

## 2019-06-19 ENCOUNTER — APPOINTMENT (OUTPATIENT)
Dept: ORTHOPEDIC SURGERY | Facility: CLINIC | Age: 69
End: 2019-06-19
Payer: MEDICARE

## 2019-06-19 VITALS — HEIGHT: 68 IN | WEIGHT: 215 LBS | BODY MASS INDEX: 32.58 KG/M2

## 2019-06-19 PROCEDURE — 99214 OFFICE O/P EST MOD 30 MIN: CPT

## 2019-06-19 NOTE — PHYSICAL EXAM
[de-identified] : Constitutional\par o Appearance : well-nourished, well developed, alert, in no acute distress \par Head and Face\par o Head :\par ¦ Inspection : atraumatic, normocephalic\par o Face :\par ¦ Inspection : no visible rash or discoloration\par Respiratory\par o Respiratory Effort: breathing unlabored \par Neurologic\par o Mental Status Examination :\par ¦ Orientation : alert and oriented X 3\par Psychiatric\par o Mood and Affect: mood normal, affect appropriate\par Cardiovascular\par o Observation/Palpation : - no swelling\par Lymphatic\par o Additional Nodes : No palpable lymph nodes present\par \par Right Upper Extremity\par o Right Elbow :\par ¦ Inspection/Palpation : no tenderness, swelling or deformities\par ¦ Range of Motion : full and painless in all planes, no crepitance\par ¦ Strength : flexion and extension 5/5\par ¦ Stability : no joint instability on provocative testing \par o Sensation : sensation intact to light touch\par Tests: Tinel’s Sign negative,No pain with resisted extension and supination\par o Wrist:\par ¦ Inspection/Palpation : no tenderness, swelling or deformities\par ¦ Range of Motion : full and painless in all planes, no crepitance\par ¦ Strength : extension, flexion, ulnar deviation and radial deviation 5/5\par ¦ Stability : no joint instability on provocative testing\par ¦ Tests/Signs : Tinel's sign negative over carpal tunnel , negative Phalen’s test and negative Finkelstein test \par o Right Forearm : no tenderness, swelling or deformities \par \par Left Upper Extremity\par o Left Elbow :\par ¦ Inspection/Palpation : no tenderness, swelling or deformities\par ¦ Range of Motion : full and painless in all planes, no crepitance\par ¦ Strength : flexion and extension 5/5\par ¦ Stability : no joint instability on provocative testing \par o Sensation : sensation intact to light touch\par Tests: Tinel’s Sign negative, no tenderness with resisted supination and extension\par o Wrist:\par ¦ Inspection/Palpation : no tenderness, swelling or deformities\par ¦ Range of Motion : full and painless in all planes, no crepitance\par ¦ Strength : extension, flexion, ulnar deviation and radial deviation 5/5\par ¦ Stability : no joint instability on provocative testing\par ¦ Tests/Signs : Tinel's sign (+ mild) over the Gion canal over carpal tunnel , negative Phalen’s test and negative Finkelstein test \par o Left Forearm : no tenderness, swelling or deformities\par \par Lumbosacral Spine\par o Inspection : no visible rash or discoloration\par o Palpation : no paraspinal musculature tenderness\par o Range of Motion : extension causes discomfort, sidebending is painless, full rotation without pain\par o Muscle Strength : paraspinal muscle strength and tone within normal limits\par o Muscle Tone : paraspinal muscle strength and tone within normal limits\par Tests: negative Franco test and negative straight leg raising, Mild pain with side bending to the right\par \par Right Lower Extremity\par o Buttock : no tenderness, swelling or deformities\par o Right Hip :\par ¦ Inspection/Palpation : no tenderness, no swelling or deformities\par ¦ Range of Motion : full and painless in all planes, no crepitance\par ¦ Stability : joint stability intact\par ¦ Strength : extension, flexion, adduction, abduction 5/5, internal rotation and external rotation 5/5 \par Tests: Tawanda’s test negative\par \par Left Lower Extremity\par o Buttock : no tenderness, swelling or deformities \par o Left Hip :\par ¦ Inspection/Palpation : no tenderness, swelling or deformities\par ¦ Range of Motion : full flexion, mild limitation of internal rotation, no crepitance\par ¦ Stability : joint stability intact\par ¦ Strength : extension, flexion, adduction, abduction 4/5, internal rotation and external rotation 4/5\par Tests: Tawanda’s test negative\par \par Gait and Station:\par Gait: normal gait, no significant extremity swelling or lymphedema, equal leg lengths, improving core strength

## 2019-06-19 NOTE — ADDENDUM
[FreeTextEntry1] : I, Madison Santiago , acted solely as a scribe for Dr. Daniel Cooney on this date 06/19/2019.\par \par All medical record entries made by the Scribe were at my, Dr. Daniel Cooney, direction and personally dictated by me on 06/19/2019. I have reviewed the chart and agree that the record accurately reflects my personal performance of the history, physical exam, assessment and plan. I have also personally directed, reviewed, and agreed with the chart.

## 2019-06-19 NOTE — DISCUSSION/SUMMARY
[de-identified] : The underlying pathophysiology was reviewed in great detail with the patient as well as the various treatment options. I discussed the importance of weight loss to alleviate her lower back and hip pain. She is to continue with physical therapy and a home exercise program to continue to improve her strength and flexibility. A prescription for Physical Therapy was provided. I recommend that on days she is not attending physical therapy she utilizes the elliptical or stationary bike for cardio as well as implement a strengthening program with ankle weights. I would like to see the patient back in the office after she returns from her vacation.

## 2019-06-19 NOTE — REASON FOR VISIT
[Follow-Up Visit] : a follow-up visit for [Other: ____] : [unfilled] all other ROS negative except as per HPI

## 2019-07-31 ENCOUNTER — APPOINTMENT (OUTPATIENT)
Dept: ORTHOPEDIC SURGERY | Facility: CLINIC | Age: 69
End: 2019-07-31
Payer: MEDICARE

## 2019-07-31 VITALS — BODY MASS INDEX: 32.58 KG/M2 | HEIGHT: 68 IN | WEIGHT: 215 LBS

## 2019-07-31 PROCEDURE — 99214 OFFICE O/P EST MOD 30 MIN: CPT

## 2019-09-05 ENCOUNTER — APPOINTMENT (OUTPATIENT)
Dept: ORTHOPEDIC SURGERY | Facility: CLINIC | Age: 69
End: 2019-09-05
Payer: MEDICARE

## 2019-09-05 VITALS — WEIGHT: 215 LBS | BODY MASS INDEX: 32.58 KG/M2 | HEIGHT: 68 IN

## 2019-09-05 PROCEDURE — 99213 OFFICE O/P EST LOW 20 MIN: CPT

## 2019-10-16 ENCOUNTER — APPOINTMENT (OUTPATIENT)
Dept: ORTHOPEDIC SURGERY | Facility: CLINIC | Age: 69
End: 2019-10-16
Payer: MEDICARE

## 2019-10-16 VITALS — WEIGHT: 215 LBS | HEIGHT: 68 IN | BODY MASS INDEX: 32.58 KG/M2

## 2019-10-16 PROCEDURE — 99214 OFFICE O/P EST MOD 30 MIN: CPT

## 2020-01-13 ENCOUNTER — OUTPATIENT (OUTPATIENT)
Dept: OUTPATIENT SERVICES | Facility: HOSPITAL | Age: 70
LOS: 1 days | End: 2020-01-13
Payer: MEDICARE

## 2020-01-13 ENCOUNTER — APPOINTMENT (OUTPATIENT)
Dept: RADIOLOGY | Facility: HOSPITAL | Age: 70
End: 2020-01-13
Payer: MEDICARE

## 2020-01-13 DIAGNOSIS — Z00.8 ENCOUNTER FOR OTHER GENERAL EXAMINATION: ICD-10-CM

## 2020-01-13 PROCEDURE — 77080 DXA BONE DENSITY AXIAL: CPT | Mod: 26

## 2020-01-13 PROCEDURE — 77080 DXA BONE DENSITY AXIAL: CPT

## 2020-01-14 ENCOUNTER — FORM ENCOUNTER (OUTPATIENT)
Age: 70
End: 2020-01-14

## 2020-01-15 ENCOUNTER — APPOINTMENT (OUTPATIENT)
Dept: MRI IMAGING | Facility: HOSPITAL | Age: 70
End: 2020-01-15
Payer: MEDICARE

## 2020-01-15 ENCOUNTER — APPOINTMENT (OUTPATIENT)
Dept: ORTHOPEDIC SURGERY | Facility: CLINIC | Age: 70
End: 2020-01-15
Payer: MEDICARE

## 2020-01-15 ENCOUNTER — OUTPATIENT (OUTPATIENT)
Dept: OUTPATIENT SERVICES | Facility: HOSPITAL | Age: 70
LOS: 1 days | End: 2020-01-15
Payer: MEDICARE

## 2020-01-15 VITALS — BODY MASS INDEX: 32.58 KG/M2 | WEIGHT: 215 LBS | HEIGHT: 68 IN

## 2020-01-15 DIAGNOSIS — Z00.8 ENCOUNTER FOR OTHER GENERAL EXAMINATION: ICD-10-CM

## 2020-01-15 DIAGNOSIS — M16.0 BILATERAL PRIMARY OSTEOARTHRITIS OF HIP: ICD-10-CM

## 2020-01-15 DIAGNOSIS — M70.62 TROCHANTERIC BURSITIS, RIGHT HIP: ICD-10-CM

## 2020-01-15 DIAGNOSIS — S32.000A WEDGE COMPRESSION FRACTURE OF UNSPECIFIED LUMBAR VERTEBRA, INITIAL ENCOUNTER FOR CLOSED FRACTURE: ICD-10-CM

## 2020-01-15 DIAGNOSIS — M70.61 TROCHANTERIC BURSITIS, RIGHT HIP: ICD-10-CM

## 2020-01-15 PROCEDURE — 99214 OFFICE O/P EST MOD 30 MIN: CPT

## 2020-01-15 PROCEDURE — 72100 X-RAY EXAM L-S SPINE 2/3 VWS: CPT

## 2020-01-15 PROCEDURE — 72148 MRI LUMBAR SPINE W/O DYE: CPT | Mod: 26

## 2020-01-15 PROCEDURE — 73521 X-RAY EXAM HIPS BI 2 VIEWS: CPT

## 2020-01-15 PROCEDURE — 72148 MRI LUMBAR SPINE W/O DYE: CPT

## 2020-01-15 NOTE — HISTORY OF PRESENT ILLNESS
[de-identified] : 69 year old patient presents for follow up of low back and bilateral hip pain. She states that the therapy did not relieve her back pain, and she still experiences pain at night, which wakes her from sleep. The pain is intense and in bilateral legs. She can only sleep on her sides. Pain is in the calves. She has been doing some home exercises, which help. She has been experiencing pain in both legs only at night. She denies numbness and tingling. She does not have pain during the day. She states she had a recent bone density test and this was negative for bone loss.  The pain at night is more than discomfort but is not of a numbness and tingling quality.  She claims position does not affect it.  She does not believe the therapy ever helped remove the discomfort.

## 2020-01-15 NOTE — PHYSICAL EXAM
[de-identified] : Constitutional\par o Appearance : well-nourished, well developed, alert, in no acute distress \par Head and Face\par o Head :\par ¦ Inspection : atraumatic, normocephalic\par o Face :\par ¦ Inspection : no visible rash or discoloration\par Respiratory\par o Respiratory Effort: breathing unlabored \par Neurologic\par o Mental Status Examination :\par ¦ Orientation : alert and oriented X 3\par Psychiatric\par o Mood and Affect: mood normal, affect appropriate\par Cardiovascular\par o Observation/Palpation : - no swelling\par Lymphatic\par o Additional Nodes : No palpable lymph nodes present\par \par Lumbosacral Spine\par o Inspection : no visible rash or discoloration, normal sensation to light touch, \par o Palpation : no paraspinal musculature tenderness\par o Range of Motion : arc of motion full in all planes, no crepitance or pain with ROM, sidebending no discomfort, extension does \par o Muscle Strength : paraspinal muscle strength and tone within normal limits\par o Muscle Tone : paraspinal muscle strength and tone within normal limits\par Tests: straight leg test negative and JESSE test negative bilaterally, no foot drop \par \par Right Upper Extremity \par O Right Wrist:\par ¦ Inspection/Palpation : no tenderness, swelling or deformities\par ¦ Range of Motion : full and painless in all planes, no crepitance\par ¦ Strength : extension, flexion, ulnar deviation and radial deviation 5/5\par ¦ Stability : no joint instability on provocative testing\par ¦ Tests/Signs : Tinel's sign negative over carpal tunnel , negative Phalen’s, negative Finkelstein’s test \par \par Right Lower Extremity\par o Buttock : no tenderness, swelling or deformities\par o Right Hip :\par ¦ Inspection/Palpation : no tenderness, no swelling or deformities, tenderness over greater trochanter and ITB\par ¦ Range of Motion : limitation rotation, no crepitance\par ¦ Stability : joint stability intact\par ¦ Strength : extension, flexion, adduction, abduction, internal rotation and external rotation 4+/5 \par Tests: Tawanda’s test negative\par \par Left Upper Extremity \par o Left Wrist:\par ¦ Inspection/Palpation : no tenderness, swelling or deformities\par ¦ Range of Motion : full and painless in all planes, no crepitance\par ¦ Strength : extension, flexion, ulnar deviation and radial deviation 5/5\par ¦ Stability : no joint instability on provocative testing\par ¦ Tests/Signs : Tinel's sign negative over carpal tunnel, negative Phalen’s, negative Finkelstein’s test \par \par Left Lower Extremity\par o Buttock : no tenderness, swelling or deformities \par o Left Hip :\par ¦ Inspection/Palpation : greater trochanteric and ITB Tenderness , no swelling or deformities\par ¦ Range of Motion : full flexion slightly limited external and internal  rotation with mild discomfort, no crepitance, no pain with gentle rotation.\par ¦ Stability : joint stability intact\par ¦ Strength : extension, flexion, adduction, abduction 4+ /5, internal rotation and external rotation 4+/5\par Tests: Tawanda’s test negative\par \par Gait: Slow lumbering gait,  no significant extremity swelling or lymphedema, significant improved core strength\par \par Radiology Results:\par Lumbar Spine: AP and lateral were obtained and revealed grade 1 spondylolisthesis L4/L5 with diffuse facet arthropathy, old compression fx L1 \par Pelvis and Hips: AP pelvis and lateral of both hips were obtained and revealed moderate to severe arthritis both hips, and moderate arthritis SI joint. \par

## 2020-01-15 NOTE — DISCUSSION/SUMMARY
[de-identified] : I discussed the underlying pathophysiology of the patient's condition in great detail with the patient. I went over the patient's x-rays with them in great detail.   The use of ice and rest was reviewed with the patient. I recommend the patient attend PT to further increase their strength and mobility.  At-home strengthening exercises were discussed and demonstrated in great detail with the patient. Needs to avoid high-impact activities such as running and jumping. I recommend alternate activities such as yoga, Pilates, barre classes, toning classes, and riding a stationary bike on low tension. \par If the MRI does not elucidate the cause for her pain EMG and nerve conduction studies will be necessary.\par \par MRI lumbosacral spine. FU with results

## 2020-01-15 NOTE — HISTORY OF PRESENT ILLNESS
[de-identified] : 69 year old patient presents for follow up of low back and bilateral hip pain. She states that the therapy did not relieve her back pain, and she still experiences pain at night, which wakes her from sleep. The pain is intense and in bilateral legs. She can only sleep on her sides. Pain is in the calves. She has been doing some home exercises, which help. She has been experiencing pain in both legs only at night. She denies numbness and tingling. She does not have pain during the day. She states she had a recent bone density test and this was negative for bone loss.  The pain at night is more than discomfort but is not of a numbness and tingling quality.  She claims position does not affect it.  She does not believe the therapy ever helped remove the discomfort.

## 2020-01-15 NOTE — DISCUSSION/SUMMARY
[de-identified] : I discussed the underlying pathophysiology of the patient's condition in great detail with the patient. I went over the patient's x-rays with them in great detail.   The use of ice and rest was reviewed with the patient. I recommend the patient attend PT to further increase their strength and mobility.  At-home strengthening exercises were discussed and demonstrated in great detail with the patient. Needs to avoid high-impact activities such as running and jumping. I recommend alternate activities such as yoga, Pilates, barre classes, toning classes, and riding a stationary bike on low tension. \par If the MRI does not elucidate the cause for her pain EMG and nerve conduction studies will be necessary.\par \par MRI lumbosacral spine. FU with results

## 2020-01-15 NOTE — PHYSICAL EXAM
[de-identified] : Constitutional\par o Appearance : well-nourished, well developed, alert, in no acute distress \par Head and Face\par o Head :\par ¦ Inspection : atraumatic, normocephalic\par o Face :\par ¦ Inspection : no visible rash or discoloration\par Respiratory\par o Respiratory Effort: breathing unlabored \par Neurologic\par o Mental Status Examination :\par ¦ Orientation : alert and oriented X 3\par Psychiatric\par o Mood and Affect: mood normal, affect appropriate\par Cardiovascular\par o Observation/Palpation : - no swelling\par Lymphatic\par o Additional Nodes : No palpable lymph nodes present\par \par Lumbosacral Spine\par o Inspection : no visible rash or discoloration, normal sensation to light touch, \par o Palpation : no paraspinal musculature tenderness\par o Range of Motion : arc of motion full in all planes, no crepitance or pain with ROM, sidebending no discomfort, extension does \par o Muscle Strength : paraspinal muscle strength and tone within normal limits\par o Muscle Tone : paraspinal muscle strength and tone within normal limits\par Tests: straight leg test negative and JESSE test negative bilaterally, no foot drop \par \par Right Upper Extremity \par O Right Wrist:\par ¦ Inspection/Palpation : no tenderness, swelling or deformities\par ¦ Range of Motion : full and painless in all planes, no crepitance\par ¦ Strength : extension, flexion, ulnar deviation and radial deviation 5/5\par ¦ Stability : no joint instability on provocative testing\par ¦ Tests/Signs : Tinel's sign negative over carpal tunnel , negative Phalen’s, negative Finkelstein’s test \par \par Right Lower Extremity\par o Buttock : no tenderness, swelling or deformities\par o Right Hip :\par ¦ Inspection/Palpation : no tenderness, no swelling or deformities, tenderness over greater trochanter and ITB\par ¦ Range of Motion : limitation rotation, no crepitance\par ¦ Stability : joint stability intact\par ¦ Strength : extension, flexion, adduction, abduction, internal rotation and external rotation 4+/5 \par Tests: Tawanda’s test negative\par \par Left Upper Extremity \par o Left Wrist:\par ¦ Inspection/Palpation : no tenderness, swelling or deformities\par ¦ Range of Motion : full and painless in all planes, no crepitance\par ¦ Strength : extension, flexion, ulnar deviation and radial deviation 5/5\par ¦ Stability : no joint instability on provocative testing\par ¦ Tests/Signs : Tinel's sign negative over carpal tunnel, negative Phalen’s, negative Finkelstein’s test \par \par Left Lower Extremity\par o Buttock : no tenderness, swelling or deformities \par o Left Hip :\par ¦ Inspection/Palpation : greater trochanteric and ITB Tenderness , no swelling or deformities\par ¦ Range of Motion : full flexion slightly limited external and internal  rotation with mild discomfort, no crepitance, no pain with gentle rotation.\par ¦ Stability : joint stability intact\par ¦ Strength : extension, flexion, adduction, abduction 4+ /5, internal rotation and external rotation 4+/5\par Tests: Tawanda’s test negative\par \par Gait: Slow lumbering gait,  no significant extremity swelling or lymphedema, significant improved core strength\par \par Radiology Results:\par Lumbar Spine: AP and lateral were obtained and revealed grade 1 spondylolisthesis L4/L5 with diffuse facet arthropathy, old compression fx L1 \par Pelvis and Hips: AP pelvis and lateral of both hips were obtained and revealed moderate to severe arthritis both hips, and moderate arthritis SI joint. \par

## 2020-01-15 NOTE — ADDENDUM
[FreeTextEntry1] : I, Gage Israel, acted solely as a scribe for Dr. Cooney on 01/15/2020  .\par  \par All medical record entries made by the scribe were at my, Dr. Cooney, direction and personally dictated by me on 01/15/2020. I have reviewed the chart and agree that the record accurately reflects my personal performance of the history, physical exam, assessment and plan. I have also personally directed, reviewed, and agreed with the chart.

## 2020-02-05 ENCOUNTER — APPOINTMENT (OUTPATIENT)
Dept: ORTHOPEDIC SURGERY | Facility: CLINIC | Age: 70
End: 2020-02-05
Payer: MEDICARE

## 2020-02-05 DIAGNOSIS — M43.16 SPONDYLOLISTHESIS, LUMBAR REGION: ICD-10-CM

## 2020-02-05 DIAGNOSIS — M47.816 SPONDYLOSIS W/OUT MYELOPATHY OR RADICULOPATHY, LUMBAR REGION: ICD-10-CM

## 2020-02-05 DIAGNOSIS — M48.061 SPINAL STENOSIS, LUMBAR REGION WITHOUT NEUROGENIC CLAUDICATION: ICD-10-CM

## 2020-02-05 PROCEDURE — 99214 OFFICE O/P EST MOD 30 MIN: CPT

## 2020-04-01 ENCOUNTER — APPOINTMENT (OUTPATIENT)
Dept: ORTHOPEDIC SURGERY | Facility: CLINIC | Age: 70
End: 2020-04-01

## 2020-11-20 ENCOUNTER — APPOINTMENT (OUTPATIENT)
Dept: CT IMAGING | Facility: HOSPITAL | Age: 70
End: 2020-11-20
Payer: MEDICARE

## 2020-11-20 ENCOUNTER — OUTPATIENT (OUTPATIENT)
Dept: OUTPATIENT SERVICES | Facility: HOSPITAL | Age: 70
LOS: 1 days | End: 2020-11-20
Payer: MEDICARE

## 2020-11-20 DIAGNOSIS — Z86.010 PERSONAL HISTORY OF COLONIC POLYPS: ICD-10-CM

## 2020-11-20 PROCEDURE — 74177 CT ABD & PELVIS W/CONTRAST: CPT | Mod: 26

## 2020-11-20 PROCEDURE — 74177 CT ABD & PELVIS W/CONTRAST: CPT

## 2020-12-05 ENCOUNTER — APPOINTMENT (OUTPATIENT)
Dept: ULTRASOUND IMAGING | Facility: HOSPITAL | Age: 70
End: 2020-12-05
Payer: MEDICARE

## 2020-12-05 ENCOUNTER — OUTPATIENT (OUTPATIENT)
Dept: OUTPATIENT SERVICES | Facility: HOSPITAL | Age: 70
LOS: 1 days | End: 2020-12-05
Payer: MEDICARE

## 2020-12-05 DIAGNOSIS — Z00.8 ENCOUNTER FOR OTHER GENERAL EXAMINATION: ICD-10-CM

## 2020-12-05 PROCEDURE — 76856 US EXAM PELVIC COMPLETE: CPT | Mod: 26

## 2020-12-05 PROCEDURE — 76856 US EXAM PELVIC COMPLETE: CPT

## 2021-01-26 ENCOUNTER — APPOINTMENT (OUTPATIENT)
Dept: GYNECOLOGIC ONCOLOGY | Facility: CLINIC | Age: 71
End: 2021-01-26
Payer: MEDICARE

## 2021-01-26 ENCOUNTER — TRANSCRIPTION ENCOUNTER (OUTPATIENT)
Age: 71
End: 2021-01-26

## 2021-01-26 VITALS
DIASTOLIC BLOOD PRESSURE: 92 MMHG | HEART RATE: 65 BPM | SYSTOLIC BLOOD PRESSURE: 158 MMHG | WEIGHT: 198 LBS | BODY MASS INDEX: 30.01 KG/M2 | HEIGHT: 68 IN

## 2021-01-26 DIAGNOSIS — R93.89 ABNORMAL FINDINGS ON DIAGNOSTIC IMAGING OF OTHER SPECIFIED BODY STRUCTURES: ICD-10-CM

## 2021-01-26 PROCEDURE — 99204 OFFICE O/P NEW MOD 45 MIN: CPT

## 2021-01-26 RX ORDER — METOPROLOL SUCCINATE 200 MG/1
TABLET, EXTENDED RELEASE ORAL
Refills: 0 | Status: ACTIVE | COMMUNITY

## 2021-01-26 RX ORDER — RIVAROXABAN 2.5 MG/1
TABLET, FILM COATED ORAL
Refills: 0 | Status: ACTIVE | COMMUNITY

## 2021-03-11 DIAGNOSIS — M81.0 AGE-RELATED OSTEOPOROSIS W/OUT CURRENT PATHOLOGICAL FRACTURE: ICD-10-CM

## 2021-03-11 DIAGNOSIS — I10 ESSENTIAL (PRIMARY) HYPERTENSION: ICD-10-CM

## 2021-03-11 DIAGNOSIS — E78.00 PURE HYPERCHOLESTEROLEMIA, UNSPECIFIED: ICD-10-CM

## 2021-03-11 DIAGNOSIS — Z82.49 FAMILY HISTORY OF ISCHEMIC HEART DISEASE AND OTHER DISEASES OF THE CIRCULATORY SYSTEM: ICD-10-CM

## 2021-03-11 DIAGNOSIS — Z82.0 FAMILY HISTORY OF EPILEPSY AND OTHER DISEASES OF THE NERVOUS SYSTEM: ICD-10-CM

## 2021-03-11 DIAGNOSIS — Z85.3 PERSONAL HISTORY OF MALIGNANT NEOPLASM OF BREAST: ICD-10-CM

## 2021-03-11 DIAGNOSIS — D64.9 ANEMIA, UNSPECIFIED: ICD-10-CM

## 2021-03-17 ENCOUNTER — INPATIENT (INPATIENT)
Facility: HOSPITAL | Age: 71
LOS: 2 days | Discharge: HOME | DRG: 178 | End: 2021-03-20
Attending: STUDENT IN AN ORGANIZED HEALTH CARE EDUCATION/TRAINING PROGRAM | Admitting: HOSPITALIST
Payer: MEDICARE

## 2021-03-17 VITALS
WEIGHT: 197.98 LBS | RESPIRATION RATE: 16 BRPM | DIASTOLIC BLOOD PRESSURE: 56 MMHG | TEMPERATURE: 98 F | HEIGHT: 68 IN | OXYGEN SATURATION: 98 % | SYSTOLIC BLOOD PRESSURE: 95 MMHG | HEART RATE: 72 BPM

## 2021-03-17 DIAGNOSIS — U07.1 COVID-19: ICD-10-CM

## 2021-03-17 LAB
ALBUMIN SERPL ELPH-MCNC: 3.2 G/DL — LOW (ref 3.3–5)
ALP SERPL-CCNC: 90 U/L — SIGNIFICANT CHANGE UP (ref 40–120)
ALT FLD-CCNC: 19 U/L — SIGNIFICANT CHANGE UP (ref 10–45)
ANION GAP SERPL CALC-SCNC: 8 MMOL/L — SIGNIFICANT CHANGE UP (ref 5–17)
AST SERPL-CCNC: 24 U/L — SIGNIFICANT CHANGE UP (ref 10–40)
BASOPHILS # BLD AUTO: 0.03 K/UL — SIGNIFICANT CHANGE UP (ref 0–0.2)
BASOPHILS NFR BLD AUTO: 0.4 % — SIGNIFICANT CHANGE UP (ref 0–2)
BILIRUB SERPL-MCNC: 0.7 MG/DL — SIGNIFICANT CHANGE UP (ref 0.2–1.2)
BUN SERPL-MCNC: 17 MG/DL — SIGNIFICANT CHANGE UP (ref 7–23)
CALCIUM SERPL-MCNC: 10.1 MG/DL — SIGNIFICANT CHANGE UP (ref 8.4–10.5)
CHLORIDE SERPL-SCNC: 104 MMOL/L — SIGNIFICANT CHANGE UP (ref 96–108)
CO2 SERPL-SCNC: 26 MMOL/L — SIGNIFICANT CHANGE UP (ref 22–31)
CREAT SERPL-MCNC: 0.98 MG/DL — SIGNIFICANT CHANGE UP (ref 0.5–1.3)
D DIMER BLD IA.RAPID-MCNC: <150 NG/ML DDU — SIGNIFICANT CHANGE UP
EOSINOPHIL # BLD AUTO: 0.03 K/UL — SIGNIFICANT CHANGE UP (ref 0–0.5)
EOSINOPHIL NFR BLD AUTO: 0.4 % — SIGNIFICANT CHANGE UP (ref 0–6)
GLUCOSE SERPL-MCNC: 107 MG/DL — HIGH (ref 70–99)
HCT VFR BLD CALC: 45.5 % — HIGH (ref 34.5–45)
HGB BLD-MCNC: 15 G/DL — SIGNIFICANT CHANGE UP (ref 11.5–15.5)
IMM GRANULOCYTES NFR BLD AUTO: 0.1 % — SIGNIFICANT CHANGE UP (ref 0–1.5)
LYMPHOCYTES # BLD AUTO: 1.4 K/UL — SIGNIFICANT CHANGE UP (ref 1–3.3)
LYMPHOCYTES # BLD AUTO: 20.4 % — SIGNIFICANT CHANGE UP (ref 13–44)
MCHC RBC-ENTMCNC: 29.3 PG — SIGNIFICANT CHANGE UP (ref 27–34)
MCHC RBC-ENTMCNC: 33 GM/DL — SIGNIFICANT CHANGE UP (ref 32–36)
MCV RBC AUTO: 88.9 FL — SIGNIFICANT CHANGE UP (ref 80–100)
MONOCYTES # BLD AUTO: 0.51 K/UL — SIGNIFICANT CHANGE UP (ref 0–0.9)
MONOCYTES NFR BLD AUTO: 7.4 % — SIGNIFICANT CHANGE UP (ref 2–14)
NEUTROPHILS # BLD AUTO: 4.88 K/UL — SIGNIFICANT CHANGE UP (ref 1.8–7.4)
NEUTROPHILS NFR BLD AUTO: 71.3 % — SIGNIFICANT CHANGE UP (ref 43–77)
NRBC # BLD: 0 /100 WBCS — SIGNIFICANT CHANGE UP (ref 0–0)
NT-PROBNP SERPL-SCNC: 1322 PG/ML — HIGH (ref 0–300)
OB PNL STL: NEGATIVE — SIGNIFICANT CHANGE UP
PLATELET # BLD AUTO: 391 K/UL — SIGNIFICANT CHANGE UP (ref 150–400)
POTASSIUM SERPL-MCNC: 4.2 MMOL/L — SIGNIFICANT CHANGE UP (ref 3.5–5.3)
POTASSIUM SERPL-SCNC: 4.2 MMOL/L — SIGNIFICANT CHANGE UP (ref 3.5–5.3)
PROT SERPL-MCNC: 7.6 G/DL — SIGNIFICANT CHANGE UP (ref 6–8.3)
RBC # BLD: 5.12 M/UL — SIGNIFICANT CHANGE UP (ref 3.8–5.2)
RBC # FLD: 12.5 % — SIGNIFICANT CHANGE UP (ref 10.3–14.5)
SARS-COV-2 RNA SPEC QL NAA+PROBE: DETECTED
SODIUM SERPL-SCNC: 138 MMOL/L — SIGNIFICANT CHANGE UP (ref 135–145)
TROPONIN I SERPL-MCNC: <.017 NG/ML — LOW (ref 0.02–0.06)
WBC # BLD: 6.86 K/UL — SIGNIFICANT CHANGE UP (ref 3.8–10.5)
WBC # FLD AUTO: 6.86 K/UL — SIGNIFICANT CHANGE UP (ref 3.8–10.5)

## 2021-03-17 PROCEDURE — 99285 EMERGENCY DEPT VISIT HI MDM: CPT | Mod: CS

## 2021-03-17 PROCEDURE — 71045 X-RAY EXAM CHEST 1 VIEW: CPT | Mod: 26

## 2021-03-17 PROCEDURE — 99223 1ST HOSP IP/OBS HIGH 75: CPT | Mod: CS

## 2021-03-17 PROCEDURE — 93010 ELECTROCARDIOGRAM REPORT: CPT

## 2021-03-17 RX ORDER — ASPIRIN/CALCIUM CARB/MAGNESIUM 324 MG
81 TABLET ORAL DAILY
Refills: 0 | Status: DISCONTINUED | OUTPATIENT
Start: 2021-03-17 | End: 2021-03-20

## 2021-03-17 RX ORDER — DILTIAZEM HCL 120 MG
1 CAPSULE, EXT RELEASE 24 HR ORAL
Qty: 0 | Refills: 0 | DISCHARGE

## 2021-03-17 RX ORDER — ACETAMINOPHEN 500 MG
650 TABLET ORAL EVERY 6 HOURS
Refills: 0 | Status: DISCONTINUED | OUTPATIENT
Start: 2021-03-17 | End: 2021-03-20

## 2021-03-17 RX ORDER — RAMIPRIL 5 MG
1 CAPSULE ORAL
Qty: 0 | Refills: 0 | DISCHARGE

## 2021-03-17 RX ORDER — ATORVASTATIN CALCIUM 80 MG/1
10 TABLET, FILM COATED ORAL DAILY
Refills: 0 | Status: DISCONTINUED | OUTPATIENT
Start: 2021-03-17 | End: 2021-03-20

## 2021-03-17 RX ORDER — SODIUM CHLORIDE 9 MG/ML
1000 INJECTION INTRAMUSCULAR; INTRAVENOUS; SUBCUTANEOUS
Refills: 0 | Status: DISCONTINUED | OUTPATIENT
Start: 2021-03-17 | End: 2021-03-20

## 2021-03-17 RX ORDER — DILTIAZEM HCL 120 MG
10 CAPSULE, EXT RELEASE 24 HR ORAL ONCE
Refills: 0 | Status: COMPLETED | OUTPATIENT
Start: 2021-03-17 | End: 2021-03-17

## 2021-03-17 RX ORDER — MULTIVIT-MIN/FERROUS GLUCONATE 9 MG/15 ML
1 LIQUID (ML) ORAL
Qty: 0 | Refills: 0 | DISCHARGE

## 2021-03-17 RX ORDER — RIVAROXABAN 15 MG-20MG
20 KIT ORAL
Refills: 0 | Status: DISCONTINUED | OUTPATIENT
Start: 2021-03-17 | End: 2021-03-20

## 2021-03-17 RX ORDER — METOPROLOL TARTRATE 50 MG
100 TABLET ORAL DAILY
Refills: 0 | Status: DISCONTINUED | OUTPATIENT
Start: 2021-03-17 | End: 2021-03-20

## 2021-03-17 RX ORDER — ASPIRIN/CALCIUM CARB/MAGNESIUM 324 MG
1 TABLET ORAL
Qty: 0 | Refills: 0 | DISCHARGE

## 2021-03-17 RX ORDER — SODIUM CHLORIDE 9 MG/ML
1000 INJECTION INTRAMUSCULAR; INTRAVENOUS; SUBCUTANEOUS ONCE
Refills: 0 | Status: COMPLETED | OUTPATIENT
Start: 2021-03-17 | End: 2021-03-17

## 2021-03-17 RX ADMIN — SODIUM CHLORIDE 1000 MILLILITER(S): 9 INJECTION INTRAMUSCULAR; INTRAVENOUS; SUBCUTANEOUS at 15:00

## 2021-03-17 RX ADMIN — SODIUM CHLORIDE 1000 MILLILITER(S): 9 INJECTION INTRAMUSCULAR; INTRAVENOUS; SUBCUTANEOUS at 14:22

## 2021-03-17 RX ADMIN — RIVAROXABAN 20 MILLIGRAM(S): KIT at 22:03

## 2021-03-17 RX ADMIN — ATORVASTATIN CALCIUM 10 MILLIGRAM(S): 80 TABLET, FILM COATED ORAL at 22:03

## 2021-03-17 RX ADMIN — SODIUM CHLORIDE 60 MILLILITER(S): 9 INJECTION INTRAMUSCULAR; INTRAVENOUS; SUBCUTANEOUS at 22:08

## 2021-03-17 RX ADMIN — Medication 10 MILLIGRAM(S): at 16:51

## 2021-03-17 NOTE — H&P ADULT - NSICDXFAMILYHX_GEN_ALL_CORE_FT
FAMILY HISTORY:  Mother  Still living? Unknown  Family history of breast cancer, Age at diagnosis: Age Unknown    Sibling  Still living? Unknown  Family history of breast cancer, Age at diagnosis: Age Unknown     FAMILY HISTORY:  Patient's father is ,  at age 57 heart Attack    Mother  Still living? Unknown  Family history of breast cancer, Age at diagnosis: Age Unknown    Sibling  Still living? Unknown  Family history of breast cancer, Age at diagnosis: Age Unknown

## 2021-03-17 NOTE — ED PROVIDER NOTE - CARE PLAN
Principal Discharge DX:	Generalized weakness   Principal Discharge DX:	Generalized weakness  Secondary Diagnosis:	Atrial fibrillation with RVR   Principal Discharge DX:	COVID-19  Secondary Diagnosis:	Atrial fibrillation with RVR  Secondary Diagnosis:	Generalized weakness

## 2021-03-17 NOTE — ED ADULT NURSE NOTE - OBJECTIVE STATEMENT
Patient endorses generalized weakness x 2 weeks now and loss of appetite. as per patient she recently received a covid vaccine. patient denies fever, no cough, no SOB, no chills, no nausea, no vomiting, no dizziness, no dysuria, no hematuria, normal BM.

## 2021-03-17 NOTE — H&P ADULT - NSHPSOCIALHISTORY_GEN_ALL_CORE
pt is 71 y/o female lives alone no family member in area, non smoker(never smoked) social drinker,   Pt not  and has no children  pts emergency contact is a friend

## 2021-03-17 NOTE — H&P ADULT - NSHPLABSRESULTS_GEN_ALL_CORE
.                            15.0   6.86  )-----------( 391      ( 17 Mar 2021 14:15 )             45.5       03-17    138  |  104  |  17  ----------------------------<  107  4.2   |  26  |  0.98    Ca    10.1      17 Mar 2021 14:15    TPro  7.6  /  Alb  3.2  /  TBili  0.7  /  DBili  x   /  AST  24  /  ALT  19  /  AlkPhos  90  03-17      CARDIAC MARKERS ( 17 Mar 2021 14:15 )  <.017 ng/mL / x     / x     / x     / x .                            15.0   6.86  )-----------( 391      ( 17 Mar 2021 14:15 )             45.5       03-17    138  |  104  |  17  ----------------------------<  107  4.2   |  26  |  0.98    Ca    10.1      17 Mar 2021 14:15    TPro  7.6  /  Alb  3.2  /  TBili  0.7  /  DBili  x   /  AST  24  /  ALT  19  /  AlkPhos  90  03-17      CARDIAC MARKERS ( 17 Mar 2021 14:15 )  <.017 ng/mL / x     / x     / x     / x      EXAM:  XR CHEST PORTABLE URGENT 1V      PROCEDURE DATE:  03/17/2021        INTERPRETATION:  AP erect chest on March 17, 2021 at 1:59 PM. Patient has chest pain.    Heart magnified by technique.    Upper bilateral rib deformity right greater than left again noted.    Lungs remain clear.    Chest is similar to September 26, 2017.    IMPRESSION: No acute finding or change. .                            15.0   6.86  )-----------( 391      ( 17 Mar 2021 14:15 )             45.5       03-17    138  |  104  |  17  ----------------------------<  107  4.2   |  26  |  0.98    Ca    10.1      17 Mar 2021 14:15    TPro  7.6  /  Alb  3.2  /  TBili  0.7  /  DBili  x   /  AST  24  /  ALT  19  /  AlkPhos  90  03-17      CARDIAC MARKERS ( 17 Mar 2021 14:15 )  <.017 ng/mL / x     / x     / x     / x      EXAM:  XR CHEST PORTABLE URGENT 1V      PROCEDURE DATE:  03/17/2021        INTERPRETATION:  AP erect chest on March 17, 2021 at 1:59 PM. Patient has chest pain.    Heart magnified by technique.    Upper bilateral rib deformity right greater than left again noted.    Lungs remain clear.    Chest is similar to September 26, 2017.    IMPRESSION: No acute finding or change.    EKG:  - reviewed by me personally

## 2021-03-17 NOTE — H&P ADULT - ATTENDING COMMENTS
I have personally seen and examined patient on the above date.  I discussed the case with KARY Martin and I agree with findings and plan as detailed per note above, which I have amended where appropriate.      Patient has been feeling weak for the last few days, and admits to decreased oral intake. She is COVID19 POSITIVE in the ED. Also has AF with RVR (does report hx of PAF). Weakness/fatigue multifactorial - including dehydration and COVID19 infection - which is possibly her only true presenting symptom.    COVID19 - does not meet criteria for Remdesivir or Decadron  Rapid AF: Rate control, on Xarelto, echo, sees Dr. Mcmillan (cardio) as outpatient, tele monitor  Weakness: IVF, PT consult    Possible d/c 1-2 days pending clinical course

## 2021-03-17 NOTE — H&P ADULT - NSICDXPASTSURGICALHX_GEN_ALL_CORE_FT
PAST SURGICAL HISTORY:  Anomaly of diaphragm chest surgery at age 1    History of bilateral mastectomy     S/P D&C     S/P T&A (status post tonsillectomy and adenoidectomy)

## 2021-03-17 NOTE — H&P ADULT - HISTORY OF PRESENT ILLNESS
69 y/o F with PMH of Afib on Xarelto, Metoprolol, and ASA 81mg, Anemia, HLD presents to the ED with c/o generalized weakness, fatigue, decreased appetite, increased thirst since receiving her COVID vaccine on 3/2/21. Pt reports she saw a PCP, Dr. Hubbard few days ago, had unremarkable labs. Pt denies CP, SOB, f/c, n/v/d, abd pain, urinary symptoms, or all other complaints 69 y/o F with PMH of Afib on Xarelto, Metoprolol, and ASA 81mg, Anemia, HLD presents to the ED with c/o generalized weakness, fatigue, increased thirst since receiving her COVID vaccine on 3/2/21, persistent, associated with dehydration and decreased appetite. Pt reports she saw a PCP, Dr. Hubbard few days ago, had unremarkable labs. Pt denies CP, SOB, f/c, n/v/d, abd pain, urinary symptoms, or all other complaints

## 2021-03-17 NOTE — ED PROVIDER NOTE - ATTENDING CONTRIBUTION TO CARE
Dr. Cameron: I performed a face to face bedside interview with patient regarding history of present illness, review of symptoms and past medical history. I completed an independent physical exam.  I have discussed patient's plan of care with PA.   I agree with note as stated above, having amended the EMR as needed to reflect my findings.   This includes HISTORY OF PRESENT ILLNESS, HIV, PAST MEDICAL/SURGICAL/FAMILY/SOCIAL HISTORY, ALLERGIES AND HOME MEDICATIONS, REVIEW OF SYSTEMS, PHYSICAL EXAM, and any PROGRESS NOTES during the time I functioned as the attending physician for this patient.    see mdm

## 2021-03-17 NOTE — H&P ADULT - ASSESSMENT
69 y/o F with PMH of Afib on Xarelto, Metoprolol, and ASA 81mg, Anemia, HLD presents to the ED with c/o generalized weakness, fatigue, decreased appetite, increased thirst since receiving her COVID vaccine on 3/2/21    #Chest Pain  #Afib with RVR   IV cardizem given in er for afib rvr   trend enzymes, EKG and Echo cardio gram   cardiac monitor   cardio consult         #HTN      #Breast CA   dvt proph     IMPROVE VTE Individual Risk Assessment    RISK                                                                Points    [  ] Previous VTE                                                  3    [  ] Thrombophilia                                               2    [  ] Lower limb paralysis                                      2        (unable to hold up >15 seconds)      [  ] Current Cancer                                              2         (within 6 months)    [  ] Immobilization > 24 hrs                                1    [  ] ICU/CCU stay > 24 hours                              1    [  ] Age > 60                                                      1    IMPROVE VTE Score _________    IMPROVE Score 0-1: Low Risk, No VTE prophylaxis required for most patients, encourage ambulation.   IMPROVE Score 2-3: At risk, pharmacologic VTE prophylaxis is indicated for most patients (in the absence of a contraindication)  IMPROVE Score > or = 4: High Risk, pharmacologic VTE prophylaxis is indicated for most patients (in the absence of a contraindication)   69 y/o F with PMH of Afib on Xarelto, Metoprolol, and ASA 81mg, Anemia, HLD presents to the ED with c/o generalized weakness, fatigue, decreased appetite, increased thirst since receiving her COVID vaccine on 3/2/21    #Fatigue  #Afib with RVR   IV cardizem given in er for afib rvr   trend enzymes, EKG and Echo cardio gram, BMP   cardiac monitor   cardio consult   CXR reviewed  Covid rapid test positive -- await Covid PCR        #HTN  controlled   continue metoprolol    HLD  continue atorvastatin       #Breast CA   stable     dvt proph - pt currently on Xalerto     IMPROVE VTE Individual Risk Assessment    RISK                                                                Points    [  ] Previous VTE                                                  3    [  ] Thrombophilia                                               2    [  ] Lower limb paralysis                                      2        (unable to hold up >15 seconds)      [  ] Current Cancer                                              2         (within 6 months)    [  ] Immobilization > 24 hrs                                1    [  ] ICU/CCU stay > 24 hours                              1    [ 1 ] Age > 60                                                      1    IMPROVE VTE Score ___1_____    IMPROVE Score 0-1: Low Risk, No VTE prophylaxis required for most patients, encourage ambulation.   IMPROVE Score 2-3: At risk, pharmacologic VTE prophylaxis is indicated for most patients (in the absence of a contraindication)  IMPROVE Score > or = 4: High Risk, pharmacologic VTE prophylaxis is indicated for most patients (in the absence of a contraindication)   69 y/o F with PMH of Afib on Xarelto, Metoprolol, and ASA 81mg, Anemia, HLD presents to the ED with c/o generalized weakness, fatigue, decreased appetite, increased thirst since receiving her COVID vaccine on 3/2/21    #Fatigue  #Afib with RVR   IV cardizem given in er for afib rvr   trend enzymes, EKG and Echo cardio gram, BMP   cardiac monitor   cardio consult   CXR reviewed  Covid rapid test positive -- await Covid PCR        #HTN  controlled   continue metoprolol  hold lasix for now     HLD  continue atorvastatin       #Breast CA   stable     dvt proph - pt currently on Xalerto     pt currently is full code. Addressed advanced directives with pt as well as HCP pt has neither.  pt would like a chance to think about [facility] and will have pallative discuss HCP and MOLST with pt tomorrow     IMPROVE VTE Individual Risk Assessment    RISK                                                                Points    [  ] Previous VTE                                                  3    [  ] Thrombophilia                                               2    [  ] Lower limb paralysis                                      2        (unable to hold up >15 seconds)      [  ] Current Cancer                                              2         (within 6 months)    [  ] Immobilization > 24 hrs                                1    [  ] ICU/CCU stay > 24 hours                              1    [ 1 ] Age > 60                                                      1    IMPROVE VTE Score ___1_____    IMPROVE Score 0-1: Low Risk, No VTE prophylaxis required for most patients, encourage ambulation.   IMPROVE Score 2-3: At risk, pharmacologic VTE prophylaxis is indicated for most patients (in the absence of a contraindication)  IMPROVE Score > or = 4: High Risk, pharmacologic VTE prophylaxis is indicated for most patients (in the absence of a contraindication)

## 2021-03-17 NOTE — H&P ADULT - NSHPPHYSICALEXAM_GEN_ALL_CORE
T(C): 36.4 (03-17-21 @ 13:31), Max: 36.4 (03-17-21 @ 13:31)  HR: 118 (03-17-21 @ 16:45) (72 - 139)  BP: 102/77 (03-17-21 @ 16:45) (95/56 - 102/77)  RR: 15 (03-17-21 @ 16:45) (15 - 16)  SpO2: 100% (03-17-21 @ 16:45) (98% - 100%)  Wt(kg): --Vital Signs Last 24 Hrs  T(C): 36.4 (17 Mar 2021 13:31), Max: 36.4 (17 Mar 2021 13:31)  T(F): 97.6 (17 Mar 2021 13:31), Max: 97.6 (17 Mar 2021 13:31)  HR: 118 (17 Mar 2021 16:45) (72 - 139)  BP: 102/77 (17 Mar 2021 16:45) (95/56 - 102/77)  BP(mean): 85 (17 Mar 2021 16:45) (85 - 85)  RR: 15 (17 Mar 2021 16:45) (15 - 16)  SpO2: 100% (17 Mar 2021 16:45) (98% - 100%)    PHYSICAL EXAM:  GENERAL: NAD, well-groomed, well-developed  HEAD:  Atraumatic, Normocephalic  EYES: EOMI, PERRLA, conjunctiva and sclera clear  ENMT: No tonsillar erythema, exudates, or enlargement; Moist mucous membranes, Good dentition, No lesions  NECK: Supple, No JVD, Normal thyroid  NERVOUS SYSTEM:  Alert & Oriented X3, Good concentration; Motor Strength 5/5 B/L upper and lower extremities; DTRs 2+ intact and symmetric  CHEST/LUNG: Clear to percussion bilaterally; No rales, rhonchi, wheezing, or rubs  HEART: Regular rate and rhythm; No murmurs, rubs, or gallops  ABDOMEN: Soft, Nontender, Nondistended; Bowel sounds present  EXTREMITIES:  2+ Peripheral Pulses, No clubbing, cyanosis, or edema  LYMPH: No lymphadenopathy noted  SKIN: No rashes or lesions T(C): 36.4 (03-17-21 @ 13:31), Max: 36.4 (03-17-21 @ 13:31)  HR: 118 (03-17-21 @ 16:45) (72 - 139)  BP: 102/77 (03-17-21 @ 16:45) (95/56 - 102/77)  RR: 15 (03-17-21 @ 16:45) (15 - 16)  SpO2: 100% (03-17-21 @ 16:45) (98% - 100%)  Wt(kg): --Vital Signs Last 24 Hrs  T(C): 36.4 (17 Mar 2021 13:31), Max: 36.4 (17 Mar 2021 13:31)  T(F): 97.6 (17 Mar 2021 13:31), Max: 97.6 (17 Mar 2021 13:31)  HR: 118 (17 Mar 2021 16:45) (72 - 139)  BP: 102/77 (17 Mar 2021 16:45) (95/56 - 102/77)  BP(mean): 85 (17 Mar 2021 16:45) (85 - 85)  RR: 15 (17 Mar 2021 16:45) (15 - 16)  SpO2: 100% (17 Mar 2021 16:45) (98% - 100%)    PHYSICAL EXAM:  GENERAL: 71 y/o in NAD, well-groomed, currently on RA at 97% sat   HEAD:  Atraumatic, Normocephalic  EYES: EOMI, PERRLA, conjunctiva and sclera clear  ENMT: No tonsillar erythema, exudates, or enlargement; Moist mucous membranes, poor dentition, No lesions  NECK: Supple, No JVD, Normal thyroid  NERVOUS SYSTEM:  Alert & Oriented X3, Good concentration; Motor Strength 5/5 B/L upper and lower extremities; DTRs 2+ intact and symmetric  CHEST/LUNG: Clear to percussion bilaterally; No rales, rhonchi, wheezing, or rubs  HEART: Irregular rate and rhythm afib ; No murmurs, rubs, or gallops  ABDOMEN: Soft, Nontender, Nondistended; Bowel sounds present  EXTREMITIES:  2+ Peripheral Pulses, No clubbing, cyanosis, or edema  LYMPH: No lymphadenopathy noted  SKIN: No rashes or lesions T(C): 36.4 (03-17-21 @ 13:31), Max: 36.4 (03-17-21 @ 13:31)  HR: 118 (03-17-21 @ 16:45) (72 - 139)  BP: 102/77 (03-17-21 @ 16:45) (95/56 - 102/77)  RR: 15 (03-17-21 @ 16:45) (15 - 16)  SpO2: 100% (03-17-21 @ 16:45) (98% - 100%)      PHYSICAL EXAM:  GENERAL: 71 y/o in NAD, well-groomed, currently on RA at 97% sat   HEAD:  Atraumatic, Normocephalic  EYES: EOMI, PERRLA, conjunctiva and sclera clear  ENMT: No tonsillar erythema, exudates, or enlargement; Moist mucous membranes, poor dentition, No lesions  NECK: Supple, No JVD, Normal thyroid  NERVOUS SYSTEM:  Alert & Oriented X3, Good concentration; Motor Strength 5/5 B/L upper and lower extremities; DTRs 2+ intact and symmetric  CHEST/LUNG: Clear to percussion bilaterally; No rales, rhonchi, wheezing, or rubs  HEART: Irregular rate and rhythm afib ; No murmurs, rubs, or gallops  ABDOMEN: Soft, Nontender, Nondistended; Bowel sounds present  EXTREMITIES:  2+ Peripheral Pulses, No clubbing, cyanosis, or edema  LYMPH: No lymphadenopathy noted  SKIN: No rashes or lesions

## 2021-03-17 NOTE — ED PROVIDER NOTE - NSFOLLOWUPINSTRUCTIONS_ED_ALL_ED_FT
Weakness    Weakness is a lack of strength. You may feel weak all over your body (generalized), or you may feel weak in one specific part of your body (focal). Common causes of weakness include:  •Infection and immune system disorders.      •Physical exhaustion.      •Internal bleeding or other blood loss that results in a lack of red blood cells (anemia).      •Dehydration.      •An imbalance in mineral (electrolyte) levels, such as potassium.      •Heart disease, circulation problems, or stroke.    Other causes include:  •Some medicines or cancer treatment.      •Stress, anxiety, or depression.      •Nervous system disorders.      •Thyroid disorders.      •Loss of muscle strength because of age or inactivity.      •Poor sleep quality or sleep disorders.      The cause of your weakness may not be known. Some causes of weakness can be serious, so it is important to see your health care provider.      Follow these instructions at home:    Activity     •Rest as needed.      •Try to get enough sleep. Most adults need 7–8 hours of quality sleep each night. Talk to your health care provider about how much sleep you need each night.      •Do exercises, such as arm curls and leg raises, for 30 minutes at least 2 days a week or as told by your health care provider. This helps build muscle strength.      •Consider working with a physical therapist or  who can develop an exercise plan to help you gain muscle strength.        General instructions      •Take over-the-counter and prescription medicines only as told by your health care provider.    •Eat a healthy, well-balanced diet. This includes:  •Proteins to build muscles, such as lean meats and fish.      •Fresh fruits and vegetables.      •Carbohydrates to boost energy, such as whole grains.        •Drink enough fluid to keep your urine pale yellow.      •Keep all follow-up visits as told by your health care provider. This is important.        Contact a health care provider if your weakness:    •Does not improve or gets worse.      •Affects your ability to think clearly.      •Affects your ability to do your normal daily activities.        Get help right away if you:    •Develop sudden weakness, especially on one side of your face or body.      •Have chest pain.      •Have trouble breathing or shortness of breath.      •Have problems with your vision.      •Have trouble talking or swallowing.      •Have trouble standing or walking.      •Are light-headed or lose consciousness.        Summary    •Weakness is a lack of strength. You may feel weak all over your body or just in one specific part of your body.      •Weakness can be caused by a variety of things. In some cases, the cause may be unknown.      •Rest as needed, and try to get enough sleep. Most adults need 7–8 hours of quality sleep each night.      •Eat a healthy, well-balanced diet.      This information is not intended to replace advice given to you by your health care provider. Make sure you discuss any questions you have with your health care provider.

## 2021-03-17 NOTE — ED PROVIDER NOTE - OBJECTIVE STATEMENT
71 y/o F with PMH of Afib on Xarelto, Metoprolol, and ASA 81mg, Anemia, HLD presents to the ED with c/o generalized weakness, fatigue, decreased appetite, increased thirst since receiving her COVID vaccine swab on 3/2/21. Pt reports she saw a PCP, Dr. Hubbard few days ago, had unremarkable labs. Pt denies CP, SOB, f/c, n/v/d, abd pain, urinary symptoms, or all other complaints 69 y/o F with PMH of Afib on Xarelto, Metoprolol, and ASA 81mg, Anemia, HLD presents to the ED with c/o generalized weakness, fatigue, decreased appetite, increased thirst since receiving her COVID vaccine on 3/2/21. Pt reports she saw a PCP, Dr. Hubbard few days ago, had unremarkable labs. Pt denies CP, SOB, f/c, n/v/d, abd pain, urinary symptoms, or all other complaints

## 2021-03-17 NOTE — H&P ADULT - NSICDXPASTMEDICALHX_GEN_ALL_CORE_FT
PAST MEDICAL HISTORY:  CA - breast cancer     Elevated cholesterol     Hx of mitral valve prolapse     Hypertension     Migraines     Uterine polyp     Vertigo positional

## 2021-03-17 NOTE — ED PROVIDER NOTE - CLINICAL SUMMARY MEDICAL DECISION MAKING FREE TEXT BOX
Dr. Cameron: 70F h/o Afib on Xarelto and metoprolol, anemia, HLD, received 1st dose of Moderna vaccine earlier this month and since then has had malaise and decreased appetite. Initially had fever after the shot but none since. No chest pain, sob, palps. Has been compliant with meds. No rectal bleeding. On exam pt is well appearing, nad, irreg irreg/tachy, dry mucosa, ctab, abdo sof/nt/nd, no pedal edema, no rectal bleeding, afebrile rectally. Will check labs, reassess. On reassessment pt's HR improved. Dr. Cameron: 70F h/o Afib on Xarelto and metoprolol, anemia, HLD, received 1st dose of Moderna vaccine earlier this month and since then has had malaise and decreased appetite. Initially had fever after the shot but none since. No chest pain, sob, palps. Has been compliant with meds. No rectal bleeding. On exam pt is well appearing, nad, irreg irreg/tachy, dry mucosa, ctab, abdo sof/nt/nd, no pedal edema, no rectal bleeding, afebrile rectally. Will check labs, reassess. On reassessment pt's HR improved, pt feels better and will be d'ed. Dr. Cameron: 70F h/o Afib on Xarelto and metoprolol, anemia, HLD, received 1st dose of Moderna vaccine earlier this month and since then has had malaise and decreased appetite. Initially had fever after the shot but none since. No chest pain, sob, palps. Has been compliant with meds. No rectal bleeding. On exam pt is well appearing, nad, irreg irreg/tachy, dry mucosa, ctab, abdo sof/nt/nd, no pedal edema, no rectal bleeding, afebrile rectally. Will check labs, reassess. On reassessment pt's HR improved, pt felt better, ambulation attempted, HR went up to 130s. Will give a dose of IV cardizem and admit to [facility].

## 2021-03-17 NOTE — ED PROVIDER NOTE - PATIENT PORTAL LINK FT
You can access the FollowMyHealth Patient Portal offered by [facility] by registering at the following website: [email or web address]. By joining [facility]’s FollowMyHealth portal, you will also be able to view your health information using other applications (apps) compatible with our system.

## 2021-03-17 NOTE — H&P ADULT - NSHPREVIEWOFSYSTEMS_GEN_ALL_CORE
REVIEW OF SYSTEMS:  CONSTITUTIONAL: No fever, weight loss, or fatigue  EYES: No eye pain, visual disturbances, or discharge  ENMT:  No difficulty hearing, tinnitus, vertigo; No sinus or throat pain  NECK: No pain or stiffness  BREASTS: No pain, masses, or nipple discharge  RESPIRATORY: No cough, wheezing, chills or hemoptysis; No shortness of breath  CARDIOVASCULAR: No chest pain, palpitations, dizziness, or leg swelling  GASTROINTESTINAL: No abdominal or epigastric pain. No nausea, vomiting, or hematemesis; No diarrhea or constipation. No melena or hematochezia.  GENITOURINARY: No dysuria, frequency, hematuria, or incontinence  NEUROLOGICAL: No headaches, memory loss, loss of strength, numbness, or tremors  SKIN: No itching, burning, rashes, or lesions   LYMPH NODES: No enlarged glands  ENDOCRINE: No heat or cold intolerance; No hair loss  MUSCULOSKELETAL: No joint pain or swelling; No muscle, back, or extremity pain  PSYCHIATRIC: No depression, anxiety, mood swings, or difficulty sleeping  HEME/LYMPH: No easy bruising, or bleeding gums  ALLERY AND IMMUNOLOGIC: No hives or eczema    ALL ROS REVIEWED AND NORMAL EXCEPT AS STATED ABOVE REVIEW OF SYSTEMS:  CONSTITUTIONAL: No fever but pt notes an intended 40lb weight loss during covid( pt does not cook and has not been able , or fatigue  EYES: No eye pain, visual disturbances, or discharge  ENMT:  No difficulty hearing, tinnitus, vertigo; No sinus or throat pain  NECK: No pain or stiffness  BREASTS: No pain, masses, or nipple discharge  RESPIRATORY: No cough, wheezing, chills or hemoptysis; No shortness of breath  CARDIOVASCULAR: No chest pain, palpitations, dizziness, or leg swelling  GASTROINTESTINAL: No abdominal or epigastric pain. No nausea, vomiting, or hematemesis; No diarrhea or constipation. No melena or hematochezia.  GENITOURINARY: No dysuria, frequency, hematuria, or incontinence  NEUROLOGICAL: No headaches, memory loss, loss of strength, numbness, or tremors  SKIN: No itching, burning, rashes, or lesions   LYMPH NODES: No enlarged glands  ENDOCRINE: No heat or cold intolerance; No hair loss  MUSCULOSKELETAL: No joint pain or swelling; No muscle, back, or extremity pain  PSYCHIATRIC: No depression, anxiety, mood swings, or difficulty sleeping  HEME/LYMPH: No easy bruising, or bleeding gums  ALLERY AND IMMUNOLOGIC: No hives or eczema    ALL ROS REVIEWED AND NORMAL EXCEPT AS STATED ABOVE REVIEW OF SYSTEMS:  CONSTITUTIONAL: No fever but pt notes an intended 40lb weight loss during covid( pt does not cook, and eats out limiting herself to 2 meals per day )pt has noted fatigue  and weakness since receiving 1st mederina vaccine shot on march 2   EYES: No eye pain, visual disturbances, or discharge  ENMT:  No difficulty hearing, tinnitus, vertigo; No sinus or throat pain  NECK: No pain or stiffness  BREASTS: bilat mastectomy for ca   RESPIRATORY: No cough, wheezing, chills or hemoptysis; No shortness of breath  CARDIOVASCULAR: occassional chest pain, palpitations, and  leg swelling denies dizziness   GASTROINTESTINAL: No abdominal or epigastric pain. No nausea, vomiting, or hematemesis; No diarrhea or constipation. No melena or hematochezia. pt notes that due to 40 lb weight loss is scheduled for colonoscopy on tuesday of next week   GENITOURINARY: No dysuria, frequency, hematuria, or incontinence  NEUROLOGICAL: No headaches, memory loss, loss of strength, numbness, or tremors  SKIN: No itching, burning, rashes, or lesions   LYMPH NODES: No enlarged glands  ENDOCRINE: pt has noted to being hot at times fanning herself ; no hair loss noted   MUSCULOSKELETAL: No joint pain or swelling; No muscle, back, or extremity pain generalized tiredness   PSYCHIATRIC: No depression, anxiety, mood swings, or difficulty sleeping  HEME/LYMPH: No easy bruising, or bleeding gums  ALLERY AND IMMUNOLOGIC: No hives or eczema    ALL ROS REVIEWED AND NORMAL EXCEPT AS STATED ABOVE

## 2021-03-18 ENCOUNTER — TRANSCRIPTION ENCOUNTER (OUTPATIENT)
Age: 71
End: 2021-03-18

## 2021-03-18 LAB
A1C WITH ESTIMATED AVERAGE GLUCOSE RESULT: 5.9 % — HIGH (ref 4–5.6)
ANION GAP SERPL CALC-SCNC: 8 MMOL/L — SIGNIFICANT CHANGE UP (ref 5–17)
BUN SERPL-MCNC: 17 MG/DL — SIGNIFICANT CHANGE UP (ref 7–23)
CALCIUM SERPL-MCNC: 9 MG/DL — SIGNIFICANT CHANGE UP (ref 8.4–10.5)
CHLORIDE SERPL-SCNC: 108 MMOL/L — SIGNIFICANT CHANGE UP (ref 96–108)
CO2 SERPL-SCNC: 26 MMOL/L — SIGNIFICANT CHANGE UP (ref 22–31)
CREAT SERPL-MCNC: 0.97 MG/DL — SIGNIFICANT CHANGE UP (ref 0.5–1.3)
ESTIMATED AVERAGE GLUCOSE: 123 MG/DL — HIGH (ref 68–114)
GLUCOSE SERPL-MCNC: 114 MG/DL — HIGH (ref 70–99)
HCT VFR BLD CALC: 40.1 % — SIGNIFICANT CHANGE UP (ref 34.5–45)
HGB BLD-MCNC: 13.1 G/DL — SIGNIFICANT CHANGE UP (ref 11.5–15.5)
MCHC RBC-ENTMCNC: 29 PG — SIGNIFICANT CHANGE UP (ref 27–34)
MCHC RBC-ENTMCNC: 32.7 GM/DL — SIGNIFICANT CHANGE UP (ref 32–36)
MCV RBC AUTO: 88.7 FL — SIGNIFICANT CHANGE UP (ref 80–100)
NRBC # BLD: 0 /100 WBCS — SIGNIFICANT CHANGE UP (ref 0–0)
PLATELET # BLD AUTO: 350 K/UL — SIGNIFICANT CHANGE UP (ref 150–400)
POTASSIUM SERPL-MCNC: 4.1 MMOL/L — SIGNIFICANT CHANGE UP (ref 3.5–5.3)
POTASSIUM SERPL-SCNC: 4.1 MMOL/L — SIGNIFICANT CHANGE UP (ref 3.5–5.3)
RBC # BLD: 4.52 M/UL — SIGNIFICANT CHANGE UP (ref 3.8–5.2)
RBC # FLD: 12.9 % — SIGNIFICANT CHANGE UP (ref 10.3–14.5)
SODIUM SERPL-SCNC: 142 MMOL/L — SIGNIFICANT CHANGE UP (ref 135–145)
TSH SERPL-MCNC: 2.78 UIU/ML — SIGNIFICANT CHANGE UP (ref 0.27–4.2)
WBC # BLD: 4.44 K/UL — SIGNIFICANT CHANGE UP (ref 3.8–10.5)
WBC # FLD AUTO: 4.44 K/UL — SIGNIFICANT CHANGE UP (ref 3.8–10.5)

## 2021-03-18 PROCEDURE — 99233 SBSQ HOSP IP/OBS HIGH 50: CPT | Mod: CS

## 2021-03-18 PROCEDURE — 99222 1ST HOSP IP/OBS MODERATE 55: CPT

## 2021-03-18 RX ORDER — LANOLIN ALCOHOL/MO/W.PET/CERES
3 CREAM (GRAM) TOPICAL AT BEDTIME
Refills: 0 | Status: DISCONTINUED | OUTPATIENT
Start: 2021-03-18 | End: 2021-03-20

## 2021-03-18 RX ADMIN — Medication 3 MILLIGRAM(S): at 22:01

## 2021-03-18 RX ADMIN — SODIUM CHLORIDE 60 MILLILITER(S): 9 INJECTION INTRAMUSCULAR; INTRAVENOUS; SUBCUTANEOUS at 22:02

## 2021-03-18 RX ADMIN — Medication 81 MILLIGRAM(S): at 22:01

## 2021-03-18 RX ADMIN — RIVAROXABAN 20 MILLIGRAM(S): KIT at 16:47

## 2021-03-18 RX ADMIN — ATORVASTATIN CALCIUM 10 MILLIGRAM(S): 80 TABLET, FILM COATED ORAL at 13:07

## 2021-03-18 RX ADMIN — Medication 100 MILLIGRAM(S): at 06:28

## 2021-03-18 NOTE — GOALS OF CARE CONVERSATION - ADVANCED CARE PLANNING - CONVERSATION DETAILS
Met with patient at bedside, she is A&Ox3. Pt. 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 PCP Dr. Mcmillan. Has hx. breast ca. and Afib. Admitted with covid. Discussed designating a HCP, has one good friend with whom she speaks to frequently, Ingrid Giraldo. I suggested that she speak to her friend Ingrid about designating her a HCP. I left the form for patient to review, and told her I would return later today to follow up.  Discussed GOC regarding attempted resuscitation and intubation. Pt. stated she had never really thought about this, but that she did not have any illnesses that made her quality of life worse. She remains Full Code.
Pt. signed HCP form designating her friend Ingrid Giraldo as HCP. Original and one copy given to patient, copy  also placed in chart.

## 2021-03-18 NOTE — DISCHARGE NOTE PROVIDER - NSDCCPCAREPLAN_GEN_ALL_CORE_FT
PRINCIPAL DISCHARGE DIAGNOSIS  Diagnosis: COVID-19  Assessment and Plan of Treatment:       SECONDARY DISCHARGE DIAGNOSES  Diagnosis: Generalized weakness  Assessment and Plan of Treatment:     Diagnosis: Atrial fibrillation with RVR  Assessment and Plan of Treatment:      PRINCIPAL DISCHARGE DIAGNOSIS  Diagnosis: COVID-19  Assessment and Plan of Treatment: You were COVID + 3/17. You may have been COVID positive prior to receiving your vaccine 3/2. Your oxygen levels were normal throughout your stay. You did not require antivirals or steroids. Use regular COVID precautions  including wearing a mask in public, social distancing and washing your hands regularly. You do not need to further quarantine. Eat a healthy diet, hydrate and exercise as tolerated. You have + COVID antibodies. Continue your regular medications. Follow up with your PCP next week.      SECONDARY DISCHARGE DIAGNOSES  Diagnosis: Generalized weakness  Assessment and Plan of Treatment: You were offered physical therapy    Diagnosis: Atrial fibrillation with RVR  Assessment and Plan of Treatment: Your EKG shows Afib. Your troponin enzymes were negative. Your echo study shows no gross defects.     PRINCIPAL DISCHARGE DIAGNOSIS  Diagnosis: COVID-19  Assessment and Plan of Treatment: You were COVID + 3/17. You may have been COVID positive prior to receiving your vaccine 3/2. Your oxygen levels were normal throughout your stay. You did not require antivirals or steroids. Use regular COVID precautions  including wearing a mask in public, social distancing and washing your hands regularly. You do not need to further quarantine. Eat a healthy diet, hydrate and exercise as tolerated. You have + COVID antibodies. Continue your regular medications. Follow up with your PCP next week.      SECONDARY DISCHARGE DIAGNOSES  Diagnosis: Generalized weakness  Assessment and Plan of Treatment: You were offered physical therapy    Diagnosis: Atrial fibrillation with RVR  Assessment and Plan of Treatment: Your EKG shows Afib. Your troponin enzymes were negative. Your echo was a sub optimal study which shows no gross defects. The ejection fraction is 50%.

## 2021-03-18 NOTE — DISCHARGE NOTE PROVIDER - CARE PROVIDER_API CALL
Harvinder Mcmillan (MD)  Cardiovascular Disease; Internal Medicine  [address] [facility], [address] [place] [address]  Phone: [phone]  Fax: [phone]  Follow Up Time:

## 2021-03-18 NOTE — CHART NOTE - NSCHARTNOTEFT_GEN_A_CORE
Nutrition Initial Assessment    Nutrition Consult Received: Yes [   ]  No [ X  ]    Reason for Initial Nutrition Assessment: assessment    Source of Information: Unable to conduct a fact to face interview due to limited contact restrictions related to pt's medical condition and isolation precautions. Information obtained from a phone call with the pt and from the EMR.    Admitting Diagnosis: 70y Female admitted for COVID-19    Generalized weakness      PAST MEDICAL & SURGICAL HISTORY:  Uterine polyp    Vertigo  positional    Migraines    CA - breast cancer    Elevated cholesterol    Hx of mitral valve prolapse    Hypertension    Anomaly of diaphragm  chest surgery at age 1    S/P D&amp;C    S/P T&amp;A (status post tonsillectomy and adenoidectomy)    History of bilateral mastectomy        Subjective Information:  pt reports better appetite. eating well. tolerates dash diet well diet briefly reviewed w/ good understanding. weight loss (intentional) of approx. 40 lbs. in the last 2 years. eating healthy at home w/ reduced portions. skin intact. no edema noted. denies any n/v/ diarrhea.       GI Issues: none    Current Nutrition Order: dash  PO Intake:   Good (%) [ X  ]    Fair (50-75%) [   ]    Poor (<50%) [   ]    Skin Integrity: WDL    Labs:   03-18 Na142 mmol/L Glu 114 mg/dL<H> K+ 4.1 mmol/L Cr  0.97 mg/dL BUN 17 mg/dL Phos n/a   Alb n/a   PAB n/a             Medications:  MEDICATIONS  (STANDING):  aspirin  chewable 81 milliGRAM(s) Oral daily  atorvastatin Oral Tab/Cap - Peds 10 milliGRAM(s) Oral daily  metoprolol succinate  milliGRAM(s) Oral daily  rivaroxaban 20 milliGRAM(s) Oral with dinner  sodium chloride 0.9%. 1000 milliLiter(s) (60 mL/Hr) IV Continuous <Continuous>    MEDICATIONS  (PRN):  acetaminophen   Tablet .. 650 milliGRAM(s) Oral every 6 hours PRN Temp greater or equal to 38C (100.4F), Mild Pain (1 - 3)    Admitted Anthropometrics:    Height (cm): 172.7 (03-17-21 @ 19:41)  Weight (kg): 84.7 (03-17-21 @ 19:41)  BMI (kg/m2): 28.4 (03-17-21 @ 19:41)    Nutrition Focused Physical Exam: Unable to complete due to limited isolation contact precautions at this time.     Estimated Energy Needs (_2040_ kcal/kg- _24__ kcal/kg):   Estimated Protein Needs (_77_ g/kg- _.9__ g/kg):   Based on weight of: 84.7 kg.     [ X ] Nutrition Diagnosis: overweight  [  ] No active nutrition diagnosis at this time  [  ] Current medical condition precludes nutrition intervention    Goal: continue to lose weight slowly, steadily once recovered from Covid 19, maintain adequate nutrition/hydration    Nutrition Interventions:     Recommendations:      RD to follow-up per protocol.

## 2021-03-18 NOTE — CONSULT NOTE ADULT - SUBJECTIVE AND OBJECTIVE BOX
Chief Complaint:     71 y/o F with PMH of Afib on Xarelto, Metoprolol, and ASA 81mg, Anemia, HLD presents to the ED with c/o generalized weakness, fatigue, increased thirst since receiving her COVID vaccine on 3/2/21, persistent, associated with dehydration and decreased appetite. Pt reports she saw a PCP, Dr. Hubbard few days ago, had unremarkable labs. Pt denies CP, SOB, f/c, n/v/d, abd pain, urinary symptoms, or all other complaints. patient of dr kaminski on noac for afib paryxysmal type    HPI:    PMH:   Uterine polyp    Vertigo    Migraines    CA - breast cancer    Elevated cholesterol    Hx of mitral valve prolapse    Hypertension      PSH:   Anomaly of diaphragm    S/P D&amp;C    S/P T&amp;A (status post tonsillectomy and adenoidectomy)    History of bilateral mastectomy      Family History:  FAMILY HISTORY:  Patient&#x27;s father is    at age 57 heart Attack    Family history of breast cancer (Mother, Sibling)  mother  73   sister from metastatic breast CA 56        Social History:  Smoking:  Alcohol:  Drugs:    Allergies:  No Known Allergies      Medications:  acetaminophen   Tablet .. 650 milliGRAM(s) Oral every 6 hours PRN  aspirin  chewable 81 milliGRAM(s) Oral daily  atorvastatin Oral Tab/Cap - Peds 10 milliGRAM(s) Oral daily  metoprolol succinate  milliGRAM(s) Oral daily  rivaroxaban 20 milliGRAM(s) Oral with dinner  sodium chloride 0.9%. 1000 milliLiter(s) IV Continuous <Continuous>      REVIEW OF SYSTEMS:  CONSTITUTIONAL: No fever, weight loss, or fatigue  EYES: No eye pain, visual disturbances, or discharge  ENMT:  No difficulty hearing, tinnitus, vertigo; No sinus or throat pain  NECK: No pain or stiffness  BREASTS: No pain, masses, or nipple discharge  RESPIRATORY: No cough, wheezing, chills or hemoptysis; No shortness of breath  CARDIOVASCULAR: No chest pain, palpitations, dizziness, or leg swelling  GASTROINTESTINAL: No abdominal or epigastric pain. No nausea, vomiting, or hematemesis; No diarrhea or constipation. No melena or hematochezia.  GENITOURINARY: No dysuria, frequency, hematuria, or incontinence  NEUROLOGICAL: No headaches, memory loss, loss of strength, numbness, or tremors  SKIN: No itching, burning, rashes, or lesions   LYMPH NODES: No enlarged glands  ENDOCRINE: No heat or cold intolerance; No hair loss  MUSCULOSKELETAL: No joint pain or swelling; No muscle, back, or extremity pain  PSYCHIATRIC: No depression, anxiety, mood swings, or difficulty sleeping  HEME/LYMPH: No easy bruising, or bleeding gums  ALLERY AND IMMUNOLOGIC: No hives or eczema    Physical Exam:  T(C): 36.6 (21 @ 10:23), Max: 36.7 (21 @ 16:45)  HR: 96 (21 @ 10:23) (72 - 139)  BP: 126/88 (21 @ 10:23) (95/56 - 127/87)  RR: 16 (21 @ 10:23) (15 - 18)  SpO2: 98% (21 @ 10:23) (97% - 100%)  Wt(kg): --    GENERAL: NAD, well-groomed, well-developed  HEAD:  Atraumatic, Normocephalic  EYES: EOMI, conjunctiva and sclera clear  ENT: Moist mucous membranes,  NECK: Supple, No JVD, no bruits  CHEST/LUNG: Clear to percussion bilaterally; No rales, rhonchi, wheezing, or rubs  HEART: Regular rate and rhythm; No murmurs, rubs, or gallops PMI non displaced.  ABDOMEN: Soft, Nontender, Nondistended; Bowel sounds present  EXTREMITIES:  2+ Peripheral Pulses, No clubbing, cyanosis, or edema  SKIN: No rashes or lesions  NERVOUS SYSTEM:  Cranial Nerves II-XII intact     Cardiovascular Diagnostic Testing:  ECG:    < from: 12 Lead ECG (21 @ 13:44) >  Diagnosis Line Atrial fibrillation with rapid ventricular response  Low voltage QRS  Abnormal ECG  When compared with ECG of 2018 10:54,  Atrial fibrillation has replaced Sinus rhythm  Vent. rate has increased BY  49 BPM    Confirmed by SHERWIN FREEMAN MD () on 3/18/2021 8:40:37 AM    < end of copied text >      ECHO:        Labs:                        13.1   4.44  )-----------( 350      ( 18 Mar 2021 05:30 )             40.1         142  |  108  |  17  ----------------------------<  114<H>  4.1   |  26  |  0.97    Ca    9.0      18 Mar 2021 05:30    TPro  7.6  /  Alb  3.2<L>  /  TBili  0.7  /  DBili  x   /  AST  24  /  ALT  19  /  AlkPhos  90        CARDIAC MARKERS ( 17 Mar 2021 23:00 )  <.017 ng/mL / x     / x     / x     / x      CARDIAC MARKERS ( 17 Mar 2021 20:15 )  <.017 ng/mL / x     / x     / x     / x      CARDIAC MARKERS ( 17 Mar 2021 14:15 )  <.017 ng/mL / x     / x     / x     / x          Serum Pro-Brain Natriuretic Peptide: 1322 pg/mL ( @ 14:15)        Thyroid Stimulating Hormone, Serum: 2.78 uIU/mL ( @ 05:30)      Imaging:  
Chief Complaint:     h1 y/o F with PMH of Afib on Xarelto, Metoprolol, and ASA 81mg, Anemia, HLD presents to the ED with c/o generalized weakness, fatigue, increased thirst since receiving her COVID vaccine on 3/2/21, persistent, associated with dehydration and decreased appetite. Pt reports she saw a PCP, Dr. Hubbard few days ago, had unremarkable labs. Pt denies CP, SOB, f/c, n/v/d, abd pain, urinary symptoms, or all other complaints she is followed by dr kaminski and lou at [facility]    HPI:    PMH:   Uterine polyp    Vertigo    Migraines    CA - breast cancer    Elevated cholesterol    Hx of mitral valve prolapse    Hypertension      PSH:   Anomaly of diaphragm    S/P D&amp;C    S/P T&amp;A (status post tonsillectomy and adenoidectomy)    History of bilateral mastectomy      Family History:  FAMILY HISTORY:  Patient&#x27;s father is    at age 57 heart Attack    Family history of breast cancer (Mother, Sibling)  mother  73   sister from metastatic breast CA 56        Social History:  Smoking:  Alcohol:  Drugs:    Allergies:  No Known Allergies      Medications:  acetaminophen   Tablet .. 650 milliGRAM(s) Oral every 6 hours PRN  aspirin  chewable 81 milliGRAM(s) Oral daily  atorvastatin Oral Tab/Cap - Peds 10 milliGRAM(s) Oral daily  melatonin 3 milliGRAM(s) Oral at bedtime  metoprolol succinate  milliGRAM(s) Oral daily  rivaroxaban 20 milliGRAM(s) Oral with dinner  sodium chloride 0.9%. 1000 milliLiter(s) IV Continuous <Continuous>      REVIEW OF SYSTEMS:  CONSTITUTIONAL: No fever, weight loss, or fatigue  EYES: No eye pain, visual disturbances, or discharge  ENMT:  No difficulty hearing, tinnitus, vertigo; No sinus or throat pain  NECK: No pain or stiffness  BREASTS: No pain, masses, or nipple discharge  RESPIRATORY: No cough, wheezing, chills or hemoptysis; No shortness of breath  CARDIOVASCULAR: No chest pain, palpitations, dizziness, or leg swelling  GASTROINTESTINAL: No abdominal or epigastric pain. No nausea, vomiting, or hematemesis; No diarrhea or constipation. No melena or hematochezia.  GENITOURINARY: No dysuria, frequency, hematuria, or incontinence  NEUROLOGICAL: No headaches, memory loss, loss of strength, numbness, or tremors  SKIN: No itching, burning, rashes, or lesions   LYMPH NODES: No enlarged glands  ENDOCRINE: No heat or cold intolerance; No hair loss  MUSCULOSKELETAL: No joint pain or swelling; No muscle, back, or extremity pain  PSYCHIATRIC: No depression, anxiety, mood swings, or difficulty sleeping  HEME/LYMPH: No easy bruising, or bleeding gums  ALLERY AND IMMUNOLOGIC: No hives or eczema    Physical Exam:  T(C): 36.3 (21 @ 22:00), Max: 36.6 (21 @ 10:23)  HR: 94 (21 @ 22:00) (94 - 99)  BP: 129/90 (21 @ 22:00) (120/77 - 129/90)  RR: 16 (21 @ 22:00) (16 - 16)  SpO2: 98% (21 @ 22:00) (97% - 98%)  Wt(kg): --    GENERAL: NAD, well-groomed, well-developed  HEAD:  Atraumatic, Normocephalic  EYES: EOMI, conjunctiva and sclera clear  ENT: Moist mucous membranes,  NECK: Supple, No JVD, no bruits  CHEST/LUNG: Clear to percussion bilaterally; No rales, rhonchi, wheezing, or rubs  HEART: Regular rate and rhythm; No murmurs, rubs, or gallops PMI non displaced.  ABDOMEN: Soft, Nontender, Nondistended; Bowel sounds present  EXTREMITIES:  2+ Peripheral Pulses, No clubbing, cyanosis, or edema  SKIN: No rashes or lesions  NERVOUS SYSTEM:  Cranial Nerves II-XII intact     Cardiovascular Diagnostic Testing:  ECG:    < from: 12 Lead ECG (21 @ 13:44) >  Diagnosis Line Atrial fibrillation with rapid ventricular response  Low voltage QRS  Abnormal ECG  When compared with ECG of 2018 10:54,  Atrial fibrillation has replaced Sinus rhythm  Vent. rate has increased BY  49 BPM    Confirmed by SHERWIN FREEMAN MD () on 3/18/2021 8:40:37 AM    < end of copied text >      ECHO:    19   ef 55-60        Labs:                        13.1   4.44  )-----------( 350      ( 18 Mar 2021 05:30 )             40.1         142  |  108  |  17  ----------------------------<  114<H>  4.1   |  26  |  0.97    Ca    9.0      18 Mar 2021 05:30    TPro  7.6  /  Alb  3.2<L>  /  TBili  0.7  /  DBili  x   /  AST  24  /  ALT  19  /  AlkPhos  90        CARDIAC MARKERS ( 17 Mar 2021 23:00 )  <.017 ng/mL / x     / x     / x     / x      CARDIAC MARKERS ( 17 Mar 2021 20:15 )  <.017 ng/mL / x     / x     / x     / x      CARDIAC MARKERS ( 17 Mar 2021 14:15 )  <.017 ng/mL / x     / x     / x     / x          Serum Pro-Brain Natriuretic Peptide: 1322 pg/mL ( @ 14:15)        Thyroid Stimulating Hormone, Serum: 2.78 uIU/mL ( @ 05:30)      Imaging:  mpi 7/15/20  ef .69 normal     cath 18 nl cors

## 2021-03-18 NOTE — DISCHARGE NOTE PROVIDER - HOSPITAL COURSE
Hospital Course  70y Female with PMH of XXXX presents with    Source of Infection:  Antibiotic / Last Day:    Palliative Care / Advanced Care Planning  Code Status: full code   Patient/Family agreeable to Hospice/Palliative (N)  Summary of Goals of Care Conversation:    Discharging Provider:  Veronica Hanley  Contact Info: Cell [phone] - Please call with any questions or concerns.    Outpatient Provider: Dr Harvinder Mcmillan           71 y/o F with PMH of Afib on Xarelto, Anemia, HLD presents to the ED with c/o generalized weakness, fatigue, decreased appetite, increased thirst since receiving her COVID vaccine on 3/2/21. + PCR on 3/17. Patient possibly COVID + prior to vaccine. Patient not hypoxic on arrival, did not qualify for Remdesivir or Decadron. Supportive care given. + Antibodies. Seen by physical therapy whom recommended home w home PT, but patient refused. Patient has known hx of Afib, found in afib with RVR on admission. Seen by cardiology. Troponin negative x 3. Continue beta blocker and Xarelto. Echo: limited study, no gross defects.    Source of Infection: COVID    Code Status: full code       Discharging Provider:  Uday Pena NP  Contact Info: Cell [phone] Please call with any questions or concerns.    Outpatient Provider: Dr Harvinder Mcmillan           69 y/o F with PMH of Afib on Xarelto, Anemia, HLD presents to the ED with c/o generalized weakness, fatigue, decreased appetite, increased thirst since receiving her COVID vaccine on 3/2/21. + PCR on 3/17. Patient possibly COVID + prior to vaccine. Patient not hypoxic on arrival, did not qualify for Remdesivir or Decadron. Supportive care given. + Antibodies. Seen by physical therapy whom recommended home w home PT, but patient refused. Patient has known hx of Afib, found in afib with RVR on admission. Seen by cardiology. Troponin negative x 3. Continue beta blocker and Xarelto. Echo: limited study, no gross defects.    Source of Infection: COVID    Code Status: full code       Discharging Provider:  Uday Pena NP  Contact Info: Cell [phone] Please call with any questions or concerns.    Outpatient Provider: Dr Harvinder Mcmillan    time spent: 35 mins

## 2021-03-18 NOTE — PHYSICAL THERAPY INITIAL EVALUATION ADULT - LEVEL OF INDEPENDENCE: STAND/SIT, REHAB EVAL
supervision
Pt received A&Ox3, NAD to ED Rm 24 from  sp ETOH intox. Pt admits to "drinking 2 of those big beers." Denies pain, N/V, or any other adverse symptoms at this time. States, "I just want to go back to [facility] to meet my friend." Denies SI, HI. Pt mildly agitated but cooperative at this time & allowing RN to take VS (as noted). RR equal & unlabored. Awaiting MD orders. PCA at bedside to initiate CO (if needed).

## 2021-03-18 NOTE — DISCHARGE NOTE PROVIDER - NSDCMRMEDTOKEN_GEN_ALL_CORE_FT
atorvastatin 10 mg oral tablet: 1 tab(s) orally once a day  Ecotrin: 81 milligram(s) orally once a day  Lasix 20 mg oral tablet: orally every other day (at bedtime)  metoprolol succinate 100 mg oral tablet, extended release: 1 tab(s) orally once a day  rivaroxaban 20 mg oral tablet: 1 tab(s) orally once a day (in the evening)

## 2021-03-19 LAB
ALBUMIN SERPL ELPH-MCNC: 2.7 G/DL — LOW (ref 3.3–5)
ALP SERPL-CCNC: 74 U/L — SIGNIFICANT CHANGE UP (ref 40–120)
ALT FLD-CCNC: 16 U/L — SIGNIFICANT CHANGE UP (ref 10–45)
ANION GAP SERPL CALC-SCNC: 6 MMOL/L — SIGNIFICANT CHANGE UP (ref 5–17)
AST SERPL-CCNC: 21 U/L — SIGNIFICANT CHANGE UP (ref 10–40)
BASOPHILS # BLD AUTO: 0.02 K/UL — SIGNIFICANT CHANGE UP (ref 0–0.2)
BASOPHILS NFR BLD AUTO: 0.4 % — SIGNIFICANT CHANGE UP (ref 0–2)
BILIRUB SERPL-MCNC: 0.5 MG/DL — SIGNIFICANT CHANGE UP (ref 0.2–1.2)
BUN SERPL-MCNC: 12 MG/DL — SIGNIFICANT CHANGE UP (ref 7–23)
CALCIUM SERPL-MCNC: 9.4 MG/DL — SIGNIFICANT CHANGE UP (ref 8.4–10.5)
CHLORIDE SERPL-SCNC: 108 MMOL/L — SIGNIFICANT CHANGE UP (ref 96–108)
CO2 SERPL-SCNC: 26 MMOL/L — SIGNIFICANT CHANGE UP (ref 22–31)
CREAT SERPL-MCNC: 0.81 MG/DL — SIGNIFICANT CHANGE UP (ref 0.5–1.3)
EOSINOPHIL # BLD AUTO: 0.14 K/UL — SIGNIFICANT CHANGE UP (ref 0–0.5)
EOSINOPHIL NFR BLD AUTO: 2.7 % — SIGNIFICANT CHANGE UP (ref 0–6)
GLUCOSE SERPL-MCNC: 94 MG/DL — SIGNIFICANT CHANGE UP (ref 70–99)
HCT VFR BLD CALC: 40.6 % — SIGNIFICANT CHANGE UP (ref 34.5–45)
HCV AB S/CO SERPL IA: 0.09 S/CO — SIGNIFICANT CHANGE UP (ref 0–0.99)
HCV AB SERPL-IMP: SIGNIFICANT CHANGE UP
HGB BLD-MCNC: 13 G/DL — SIGNIFICANT CHANGE UP (ref 11.5–15.5)
IMM GRANULOCYTES NFR BLD AUTO: 0.2 % — SIGNIFICANT CHANGE UP (ref 0–1.5)
LYMPHOCYTES # BLD AUTO: 1.52 K/UL — SIGNIFICANT CHANGE UP (ref 1–3.3)
LYMPHOCYTES # BLD AUTO: 29.4 % — SIGNIFICANT CHANGE UP (ref 13–44)
MCHC RBC-ENTMCNC: 29.3 PG — SIGNIFICANT CHANGE UP (ref 27–34)
MCHC RBC-ENTMCNC: 32 GM/DL — SIGNIFICANT CHANGE UP (ref 32–36)
MCV RBC AUTO: 91.4 FL — SIGNIFICANT CHANGE UP (ref 80–100)
MONOCYTES # BLD AUTO: 0.5 K/UL — SIGNIFICANT CHANGE UP (ref 0–0.9)
MONOCYTES NFR BLD AUTO: 9.7 % — SIGNIFICANT CHANGE UP (ref 2–14)
NEUTROPHILS # BLD AUTO: 2.98 K/UL — SIGNIFICANT CHANGE UP (ref 1.8–7.4)
NEUTROPHILS NFR BLD AUTO: 57.6 % — SIGNIFICANT CHANGE UP (ref 43–77)
NRBC # BLD: 0 /100 WBCS — SIGNIFICANT CHANGE UP (ref 0–0)
PLATELET # BLD AUTO: 379 K/UL — SIGNIFICANT CHANGE UP (ref 150–400)
POTASSIUM SERPL-MCNC: 4.4 MMOL/L — SIGNIFICANT CHANGE UP (ref 3.5–5.3)
POTASSIUM SERPL-SCNC: 4.4 MMOL/L — SIGNIFICANT CHANGE UP (ref 3.5–5.3)
PROT SERPL-MCNC: 6.3 G/DL — SIGNIFICANT CHANGE UP (ref 6–8.3)
RBC # BLD: 4.44 M/UL — SIGNIFICANT CHANGE UP (ref 3.8–5.2)
RBC # FLD: 13.2 % — SIGNIFICANT CHANGE UP (ref 10.3–14.5)
SODIUM SERPL-SCNC: 140 MMOL/L — SIGNIFICANT CHANGE UP (ref 135–145)
WBC # BLD: 5.17 K/UL — SIGNIFICANT CHANGE UP (ref 3.8–10.5)
WBC # FLD AUTO: 5.17 K/UL — SIGNIFICANT CHANGE UP (ref 3.8–10.5)

## 2021-03-19 PROCEDURE — 99233 SBSQ HOSP IP/OBS HIGH 50: CPT | Mod: CS

## 2021-03-19 RX ADMIN — Medication 81 MILLIGRAM(S): at 21:26

## 2021-03-19 RX ADMIN — ATORVASTATIN CALCIUM 10 MILLIGRAM(S): 80 TABLET, FILM COATED ORAL at 17:55

## 2021-03-19 RX ADMIN — Medication 100 MILLIGRAM(S): at 05:55

## 2021-03-19 RX ADMIN — SODIUM CHLORIDE 60 MILLILITER(S): 9 INJECTION INTRAMUSCULAR; INTRAVENOUS; SUBCUTANEOUS at 21:25

## 2021-03-19 RX ADMIN — Medication 3 MILLIGRAM(S): at 21:26

## 2021-03-19 RX ADMIN — RIVAROXABAN 20 MILLIGRAM(S): KIT at 17:55

## 2021-03-19 NOTE — PROGRESS NOTE ADULT - ATTENDING COMMENTS
Pt seen and examined.  COVID positive  Chronic Afib presented with RVR - now rate controlled on BB  on AC  Cardio recs appreciated    Dispo: Home with homecare however patient refused

## 2021-03-20 ENCOUNTER — TRANSCRIPTION ENCOUNTER (OUTPATIENT)
Age: 71
End: 2021-03-20

## 2021-03-20 VITALS
HEART RATE: 79 BPM | RESPIRATION RATE: 16 BRPM | DIASTOLIC BLOOD PRESSURE: 90 MMHG | OXYGEN SATURATION: 100 % | SYSTOLIC BLOOD PRESSURE: 128 MMHG

## 2021-03-20 LAB
ALBUMIN SERPL ELPH-MCNC: 2.6 G/DL — LOW (ref 3.3–5)
ALP SERPL-CCNC: 73 U/L — SIGNIFICANT CHANGE UP (ref 40–120)
ALT FLD-CCNC: 18 U/L — SIGNIFICANT CHANGE UP (ref 10–45)
ANION GAP SERPL CALC-SCNC: 8 MMOL/L — SIGNIFICANT CHANGE UP (ref 5–17)
AST SERPL-CCNC: 17 U/L — SIGNIFICANT CHANGE UP (ref 10–40)
BASOPHILS # BLD AUTO: 0.03 K/UL — SIGNIFICANT CHANGE UP (ref 0–0.2)
BASOPHILS NFR BLD AUTO: 0.6 % — SIGNIFICANT CHANGE UP (ref 0–2)
BILIRUB SERPL-MCNC: 0.5 MG/DL — SIGNIFICANT CHANGE UP (ref 0.2–1.2)
BUN SERPL-MCNC: 13 MG/DL — SIGNIFICANT CHANGE UP (ref 7–23)
CALCIUM SERPL-MCNC: 9.2 MG/DL — SIGNIFICANT CHANGE UP (ref 8.4–10.5)
CHLORIDE SERPL-SCNC: 110 MMOL/L — HIGH (ref 96–108)
CO2 SERPL-SCNC: 25 MMOL/L — SIGNIFICANT CHANGE UP (ref 22–31)
CREAT SERPL-MCNC: 0.76 MG/DL — SIGNIFICANT CHANGE UP (ref 0.5–1.3)
EOSINOPHIL # BLD AUTO: 0.13 K/UL — SIGNIFICANT CHANGE UP (ref 0–0.5)
EOSINOPHIL NFR BLD AUTO: 2.6 % — SIGNIFICANT CHANGE UP (ref 0–6)
GLUCOSE SERPL-MCNC: 92 MG/DL — SIGNIFICANT CHANGE UP (ref 70–99)
HCT VFR BLD CALC: 40.2 % — SIGNIFICANT CHANGE UP (ref 34.5–45)
HGB BLD-MCNC: 13.3 G/DL — SIGNIFICANT CHANGE UP (ref 11.5–15.5)
IMM GRANULOCYTES NFR BLD AUTO: 0.2 % — SIGNIFICANT CHANGE UP (ref 0–1.5)
LYMPHOCYTES # BLD AUTO: 1.61 K/UL — SIGNIFICANT CHANGE UP (ref 1–3.3)
LYMPHOCYTES # BLD AUTO: 32.2 % — SIGNIFICANT CHANGE UP (ref 13–44)
MCHC RBC-ENTMCNC: 30.2 PG — SIGNIFICANT CHANGE UP (ref 27–34)
MCHC RBC-ENTMCNC: 33.1 GM/DL — SIGNIFICANT CHANGE UP (ref 32–36)
MCV RBC AUTO: 91.2 FL — SIGNIFICANT CHANGE UP (ref 80–100)
MONOCYTES # BLD AUTO: 0.59 K/UL — SIGNIFICANT CHANGE UP (ref 0–0.9)
MONOCYTES NFR BLD AUTO: 11.8 % — SIGNIFICANT CHANGE UP (ref 2–14)
NEUTROPHILS # BLD AUTO: 2.63 K/UL — SIGNIFICANT CHANGE UP (ref 1.8–7.4)
NEUTROPHILS NFR BLD AUTO: 52.6 % — SIGNIFICANT CHANGE UP (ref 43–77)
NRBC # BLD: 0 /100 WBCS — SIGNIFICANT CHANGE UP (ref 0–0)
PLATELET # BLD AUTO: 376 K/UL — SIGNIFICANT CHANGE UP (ref 150–400)
POTASSIUM SERPL-MCNC: 4.2 MMOL/L — SIGNIFICANT CHANGE UP (ref 3.5–5.3)
POTASSIUM SERPL-SCNC: 4.2 MMOL/L — SIGNIFICANT CHANGE UP (ref 3.5–5.3)
PROT SERPL-MCNC: 6.2 G/DL — SIGNIFICANT CHANGE UP (ref 6–8.3)
RBC # BLD: 4.41 M/UL — SIGNIFICANT CHANGE UP (ref 3.8–5.2)
RBC # FLD: 13 % — SIGNIFICANT CHANGE UP (ref 10.3–14.5)
SODIUM SERPL-SCNC: 143 MMOL/L — SIGNIFICANT CHANGE UP (ref 135–145)
WBC # BLD: 5 K/UL — SIGNIFICANT CHANGE UP (ref 3.8–10.5)
WBC # FLD AUTO: 5 K/UL — SIGNIFICANT CHANGE UP (ref 3.8–10.5)

## 2021-03-20 PROCEDURE — 36415 COLL VENOUS BLD VENIPUNCTURE: CPT

## 2021-03-20 PROCEDURE — 83880 ASSAY OF NATRIURETIC PEPTIDE: CPT

## 2021-03-20 PROCEDURE — 93308 TTE F-UP OR LMTD: CPT

## 2021-03-20 PROCEDURE — 84484 ASSAY OF TROPONIN QUANT: CPT

## 2021-03-20 PROCEDURE — 93005 ELECTROCARDIOGRAM TRACING: CPT

## 2021-03-20 PROCEDURE — 96360 HYDRATION IV INFUSION INIT: CPT

## 2021-03-20 PROCEDURE — 99239 HOSP IP/OBS DSCHRG MGMT >30: CPT | Mod: CS

## 2021-03-20 PROCEDURE — 85379 FIBRIN DEGRADATION QUANT: CPT

## 2021-03-20 PROCEDURE — 71045 X-RAY EXAM CHEST 1 VIEW: CPT

## 2021-03-20 PROCEDURE — 82272 OCCULT BLD FECES 1-3 TESTS: CPT

## 2021-03-20 PROCEDURE — 85610 PROTHROMBIN TIME: CPT

## 2021-03-20 PROCEDURE — 85027 COMPLETE CBC AUTOMATED: CPT

## 2021-03-20 PROCEDURE — 86769 SARS-COV-2 COVID-19 ANTIBODY: CPT

## 2021-03-20 PROCEDURE — 85730 THROMBOPLASTIN TIME PARTIAL: CPT

## 2021-03-20 PROCEDURE — 93308 TTE F-UP OR LMTD: CPT | Mod: 26

## 2021-03-20 PROCEDURE — 80048 BASIC METABOLIC PNL TOTAL CA: CPT

## 2021-03-20 PROCEDURE — 84443 ASSAY THYROID STIM HORMONE: CPT

## 2021-03-20 PROCEDURE — 85025 COMPLETE CBC W/AUTO DIFF WBC: CPT

## 2021-03-20 PROCEDURE — 87635 SARS-COV-2 COVID-19 AMP PRB: CPT

## 2021-03-20 PROCEDURE — 86803 HEPATITIS C AB TEST: CPT

## 2021-03-20 PROCEDURE — 99285 EMERGENCY DEPT VISIT HI MDM: CPT | Mod: 25

## 2021-03-20 PROCEDURE — 80053 COMPREHEN METABOLIC PANEL: CPT

## 2021-03-20 PROCEDURE — 83036 HEMOGLOBIN GLYCOSYLATED A1C: CPT

## 2021-03-20 RX ORDER — FUROSEMIDE 40 MG
20 TABLET ORAL
Refills: 0 | Status: DISCONTINUED | OUTPATIENT
Start: 2021-03-20 | End: 2021-03-20

## 2021-03-20 RX ADMIN — Medication 100 MILLIGRAM(S): at 05:59

## 2021-03-20 NOTE — PROGRESS NOTE ADULT - SUBJECTIVE AND OBJECTIVE BOX
Patient is a 70y old  Female who presents with a chief complaint of Fatigue     Patient seen and examined at bedside. Pt states they feel well, denies overnight events or current complaints including chest pain, shortness of breath, dizziness, nausea, vomiting, diarrhea, fever or chills.      ALLERGIES:  No Known Allergies    MEDICATIONS  (STANDING):  aspirin  chewable 81 milliGRAM(s) Oral daily  atorvastatin Oral Tab/Cap - Peds 10 milliGRAM(s) Oral daily  melatonin 3 milliGRAM(s) Oral at bedtime  metoprolol succinate  milliGRAM(s) Oral daily  rivaroxaban 20 milliGRAM(s) Oral with dinner  sodium chloride 0.9%. 1000 milliLiter(s) (60 mL/Hr) IV Continuous <Continuous>    MEDICATIONS  (PRN):  acetaminophen   Tablet .. 650 milliGRAM(s) Oral every 6 hours PRN Temp greater or equal to 38C (100.4F), Mild Pain (1 - 3)    Vital Signs Last 24 Hrs  T(F): 97.1 (20 Mar 2021 06:30), Max: 97.5 (19 Mar 2021 21:03)  HR: 76 (20 Mar 2021 06:30) (76 - 103)  BP: 147/96 (20 Mar 2021 06:30) (141/100 - 152/109)  RR: 16 (20 Mar 2021 06:30) (16 - 16)  SpO2: 97% (20 Mar 2021 06:30) (97% - 98%)  I&O's Summary    19 Mar 2021 07:01  -  20 Mar 2021 07:00  --------------------------------------------------------  IN: 1880 mL / OUT: 0 mL / NET: 1880 mL      PHYSICAL EXAM:  General: NAD, A/O x 3  ENT: MMM, no oral thrush   Neck: Supple, No JVD  Lungs: Clear to auscultation bilaterally, non labored breathing  Cardio: IRIR, S1/S2, No murmurs  Abdomen: Soft, Nontender, Nondistended; Bowel sounds present  Extremities: No calf tenderness, No pitting edema    LABS:                        13.3   5.00  )-----------( 376      ( 20 Mar 2021 06:47 )             40.2     03-20    143  |  110  |  13  ----------------------------<  92  4.2   |  25  |  0.76    Ca    9.2      20 Mar 2021 06:47    TPro  6.2  /  Alb  2.6  /  TBili  0.5  /  DBili  x   /  AST  17  /  ALT  18  /  AlkPhos  73  03-20    eGFR if Non African American: 78 mL/min/1.73M2 (03-20-21 @ 06:47)  eGFR if : 90 mL/min/1.73M2 (03-20-21 @ 06:47)        CARDIAC MARKERS ( 17 Mar 2021 23:00 )  <.017 ng/mL / x     / x     / x     / x      CARDIAC MARKERS ( 17 Mar 2021 20:15 )  <.017 ng/mL / x     / x     / x     / x      CARDIAC MARKERS ( 17 Mar 2021 14:15 )  <.017 ng/mL / x     / x     / x     / x            TSH 2.78   TSH with FT4 reflex --  Total T3 --                RADIOLOGY & ADDITIONAL TESTS:    < from: TTE Echo Limited or F/U (03.20.21 @ 07:08) >  Summary:   1. Technically markedly suboptimal study Only   2. Only the apical views are visible and this is technically difficult as well   3. The RV seems mildly enlarged   4. Neither the RV or LV are severely impaired in this limited view   5. The left ventricular diastolic function could not be assessed in this study.    [id number] Derick Muse MD, [facility] , Electronically signed on 3/20/2021 at 10:43:34 AM            *** Final ***                DERICK MUSE   This document has been electronically signed. Mar 20 2021  7:08AM    < end of copied text >    Care Discussed with Consultants/Other Providers:   
Patient is a 70y old  Female who presents with a chief complaint of Fatigue (17 Mar 2021 16:47)      Patient seen and examined at bedside.    ALLERGIES:  No Known Allergies    MEDICATIONS  (STANDING):  aspirin  chewable 81 milliGRAM(s) Oral daily  atorvastatin Oral Tab/Cap - Peds 10 milliGRAM(s) Oral daily  metoprolol succinate  milliGRAM(s) Oral daily  rivaroxaban 20 milliGRAM(s) Oral with dinner  sodium chloride 0.9%. 1000 milliLiter(s) (60 mL/Hr) IV Continuous <Continuous>    MEDICATIONS  (PRN):  acetaminophen   Tablet .. 650 milliGRAM(s) Oral every 6 hours PRN Temp greater or equal to 38C (100.4F), Mild Pain (1 - 3)    Vital Signs Last 24 Hrs  T(F): 97.4 (18 Mar 2021 05:35), Max: 98 (17 Mar 2021 16:45)  HR: 96 (18 Mar 2021 05:35) (72 - 139)  BP: 127/86 (18 Mar 2021 05:35) (95/56 - 127/87)  RR: 16 (18 Mar 2021 05:35) (15 - 18)  SpO2: 97% (18 Mar 2021 05:35) (97% - 100%)  I&O's Summary    17 Mar 2021 07:01  -  18 Mar 2021 07:00  --------------------------------------------------------  IN: 720 mL / OUT: 0 mL / NET: 720 mL      PHYSICAL EXAM:  General: NAD, A/O x 3  ENT: MMM  Neck: Supple, No JVD  Lungs: Clear to auscultation bilaterally, Non labored breathing   Cardio: RRR, S1/S2, No murmurs  Abdomen: Soft, Nontender, Nondistended; Bowel sounds present  Extremities: No calf tenderness, No pitting edema    LABS:                        15.0   6.86  )-----------( 391      ( 17 Mar 2021 14:15 )             45.5     03-17    138  |  104  |  17  ----------------------------<  107  4.2   |  26  |  0.98    Ca    10.1      17 Mar 2021 14:15    TPro  7.6  /  Alb  3.2  /  TBili  0.7  /  DBili  x   /  AST  24  /  ALT  19  /  AlkPhos  90  03-17    eGFR if : 67 mL/min/1.73M2 (03-17-21 @ 14:15)  eGFR if Non African American: 57 mL/min/1.73M2 (03-17-21 @ 14:15)        CARDIAC MARKERS ( 17 Mar 2021 23:00 )  <.017 ng/mL / x     / x     / x     / x      CARDIAC MARKERS ( 17 Mar 2021 20:15 )  <.017 ng/mL / x     / x     / x     / x      CARDIAC MARKERS ( 17 Mar 2021 14:15 )  <.017 ng/mL / x     / x     / x     / x                                RADIOLOGY & ADDITIONAL TESTS:    Care Discussed with Consultants/Other Providers:   
Patient is a 70y old  Female who presents with a chief complaint of Fatigue (18 Mar 2021 22:17)    Patient seen and examined at bedside.  Pt. c/o fatigue and generalized weakness.  No shortness of breath.    ALLERGIES:  No Known Allergies    MEDICATIONS  (STANDING):  aspirin  chewable 81 milliGRAM(s) Oral daily  atorvastatin Oral Tab/Cap - Peds 10 milliGRAM(s) Oral daily  melatonin 3 milliGRAM(s) Oral at bedtime  metoprolol succinate  milliGRAM(s) Oral daily  rivaroxaban 20 milliGRAM(s) Oral with dinner  sodium chloride 0.9%. 1000 milliLiter(s) (60 mL/Hr) IV Continuous <Continuous>    MEDICATIONS  (PRN):  acetaminophen   Tablet .. 650 milliGRAM(s) Oral every 6 hours PRN Temp greater or equal to 38C (100.4F), Mild Pain (1 - 3)    Vital Signs Last 24 Hrs  T(F): 97.4 (19 Mar 2021 05:09), Max: 97.7 (18 Mar 2021 16:01)  HR: 86 (19 Mar 2021 05:09) (86 - 99)  BP: 149/98 (19 Mar 2021 05:09) (120/77 - 149/98)  RR: 16 (18 Mar 2021 22:00) (16 - 16)  SpO2: 97% (19 Mar 2021 05:09) (97% - 98%)  I&O's Summary    18 Mar 2021 07:01  -  19 Mar 2021 07:00  --------------------------------------------------------  IN: 1320 mL / OUT: 0 mL / NET: 1320 mL    19 Mar 2021 07:01  -  19 Mar 2021 13:38  --------------------------------------------------------  IN: 620 mL / OUT: 0 mL / NET: 620 mL      PHYSICAL EXAM:  General: NAD, A/O x 3  ENT: MMM, no thrush  Neck: Supple, No JVD  Lungs: non-labored breathing, Clear to auscultation bilaterally, no w/r/r  Cardio: RRR, S1/S2, No murmurs  Abdomen: Soft, Nontender, Nondistended; Bowel sounds present  Extremities: No calf tenderness, No pitting edema  Neuro:  alert, nonfocal    LABS:                        13.0   5.17  )-----------( 379      ( 19 Mar 2021 05:30 )             40.6     03-19    140  |  108  |  12  ----------------------------<  94  4.4   |  26  |  0.81    Ca    9.4      19 Mar 2021 05:30    TPro  6.3  /  Alb  2.7  /  TBili  0.5  /  DBili  x   /  AST  21  /  ALT  16  /  AlkPhos  74  03-19    eGFR if Non African American: 72 mL/min/1.73M2 (03-19-21 @ 05:30)  eGFR if African American: 84 mL/min/1.73M2 (03-19-21 @ 05:30)        CARDIAC MARKERS ( 17 Mar 2021 23:00 )  <.017 ng/mL / x     / x     / x     / x      CARDIAC MARKERS ( 17 Mar 2021 20:15 )  <.017 ng/mL / x     / x     / x     / x      CARDIAC MARKERS ( 17 Mar 2021 14:15 )  <.017 ng/mL / x     / x     / x     / x            TSH 2.78   TSH with FT4 reflex --  Total T3 --                        RADIOLOGY & ADDITIONAL TESTS:    Care Discussed with Consultants/Other Providers:

## 2021-03-20 NOTE — PROGRESS NOTE ADULT - ASSESSMENT
69 y/o F with PMH of Afib on Xarelto, Anemia, HLD presents to the ED with c/o generalized weakness, fatigue, decreased appetite, increased thirst since receiving her COVID vaccine on 3/2/21.    #COVID-19 viral illness  -Covid pcr + on 3/17  -pt received 1st vaccine on 3/2, probably Covid + prior to vaccine   -pt not hypoxic, Remdisivir and Decadron not indicated  -continue supportive care  -+ antibodies   -PT eval:  recommends home with home PT. Pt offered, but refused.     #Atrial fib with RVR  -heart rate improved, continue BB  -Xarelto for anticoagulation  -trops neg. x 3  -Echo: limited study, no gross defects.   -Cardiology note appreciated    #HTN  -controlled; continue metoprolol      #HLD  -continue atorvastatin     #Breast CA   -stable     DVT ppx: on Xarelto    Dispo:  FULL CODE.  She stated there is no one that we need to contact, she lives by herself.  She will update her friends.
69 y/o F with PMH of Afib on Xarelto, Anemia, HLD presents to the ED with c/o generalized weakness, fatigue, decreased appetite, increased thirst since receiving her COVID vaccine on 3/2/21.    #COVID-19 viral illness  -Covid pcr + on 3/17  -pt received 1st vaccine on 3/2, probably Covid + prior to vaccine   -pt not hypoxic, Remdisivir and Decadron not indicated  -continue supportive care  -PT eval:  recommends home with home PT    #Atrial fib with RVR  -heart rate improved, continue BB  -Xarelto for anticoagulation  -trops neg. x 3  -Cardiology note appreciated    #HTN  -controlled; continue metoprolol  -hold lasix for now     #HLD  -continue atorvastatin     #Breast CA   -stable     DVT ppx: on Xarelto    Dispo:  FULL CODE.  She stated there is no one that we need to contact, she lives by herself.  She will update her friends.
69 y/o F with PMH of Afib on Xarelto, Metoprolol, and ASA 81mg, Anemia, HLD presents to the ED with c/o generalized weakness, fatigue, decreased appetite, increased thirst since receiving her COVID vaccine on 3/2/21    #Fatigue  #Afib with RVR   IV cardizem given in er for afib rvr   trend enzymes, EKG and Echo cardio gram, BMP   cardiac monitor   cardio consult   CXR reviewed  Covid rapid test positive -- await Covid PCR        #HTN  controlled   continue metoprolol  hold lasix for now     HLD  continue atorvastatin       #Breast CA   stable     dvt proph - pt currently on Xalerto     pt currently is full code. Addressed advanced directives with pt as well as HCP pt has neither.  pt would like a chance to think about Molst and will have pallative discuss HCP and MOLST with pt tomorrow     IMPROVE VTE Individual Risk Assessment    RISK                                                                Points    [  ] Previous VTE                                                  3    [  ] Thrombophilia                                               2    [  ] Lower limb paralysis                                      2        (unable to hold up >15 seconds)      [  ] Current Cancer                                              2         (within 6 months)    [  ] Immobilization > 24 hrs                                1    [  ] ICU/CCU stay > 24 hours                              1    [ 1 ] Age > 60                                                      1    IMPROVE VTE Score ___1_____    IMPROVE Score 0-1: Low Risk, No VTE prophylaxis required for most patients, encourage ambulation.   IMPROVE Score 2-3: At risk, pharmacologic VTE prophylaxis is indicated for most patients (in the absence of a contraindication)  IMPROVE Score > or = 4: High Risk, pharmacologic VTE prophylaxis is indicated for most patients (in the absence of a contraindication)

## 2021-03-20 NOTE — DISCHARGE NOTE NURSING/CASE MANAGEMENT/SOCIAL WORK - PATIENT PORTAL LINK FT
You can access the FollowMyHealth Patient Portal offered by [facility] by registering at the following website: [email or web address]. By joining [facility]’s FollowMyHealth portal, you will also be able to view your health information using other applications (apps) compatible with our system.

## 2021-03-20 NOTE — PROGRESS NOTE ADULT - ATTENDING COMMENTS
Pt seen and examined.  COVID positive  Chronic Afib presented with RVR - now rate controlled on BB  on AC  Cardio recs appreciated    Dispo: Home today

## 2021-03-21 ENCOUNTER — TRANSCRIPTION ENCOUNTER (OUTPATIENT)
Age: 71
End: 2021-03-21

## 2021-03-25 ENCOUNTER — TRANSCRIPTION ENCOUNTER (OUTPATIENT)
Age: 71
End: 2021-03-25

## 2021-06-08 ENCOUNTER — APPOINTMENT (OUTPATIENT)
Dept: NEUROLOGY | Facility: CLINIC | Age: 71
End: 2021-06-08
Payer: MEDICARE

## 2021-06-08 VITALS
WEIGHT: 188 LBS | SYSTOLIC BLOOD PRESSURE: 146 MMHG | HEART RATE: 72 BPM | HEIGHT: 68 IN | DIASTOLIC BLOOD PRESSURE: 84 MMHG | BODY MASS INDEX: 28.49 KG/M2

## 2021-06-08 PROCEDURE — 99213 OFFICE O/P EST LOW 20 MIN: CPT

## 2021-06-08 PROCEDURE — 99214 OFFICE O/P EST MOD 30 MIN: CPT

## 2021-06-25 ENCOUNTER — APPOINTMENT (OUTPATIENT)
Dept: GYNECOLOGIC ONCOLOGY | Facility: CLINIC | Age: 71
End: 2021-06-25
Payer: MEDICARE

## 2021-06-25 VITALS
DIASTOLIC BLOOD PRESSURE: 70 MMHG | SYSTOLIC BLOOD PRESSURE: 116 MMHG | BODY MASS INDEX: 28.49 KG/M2 | HEIGHT: 68 IN | HEART RATE: 75 BPM | WEIGHT: 188 LBS

## 2021-06-25 PROCEDURE — 99213 OFFICE O/P EST LOW 20 MIN: CPT

## 2021-07-30 PROCEDURE — 93010 ELECTROCARDIOGRAM REPORT: CPT

## 2021-08-03 ENCOUNTER — TRANSCRIPTION ENCOUNTER (OUTPATIENT)
Age: 71
End: 2021-08-03

## 2021-08-05 ENCOUNTER — OUTPATIENT (OUTPATIENT)
Dept: OUTPATIENT SERVICES | Facility: HOSPITAL | Age: 71
LOS: 1 days | End: 2021-08-05
Payer: MEDICARE

## 2021-08-05 VITALS
HEIGHT: 68 IN | WEIGHT: 186.07 LBS | HEART RATE: 87 BPM | DIASTOLIC BLOOD PRESSURE: 80 MMHG | OXYGEN SATURATION: 98 % | TEMPERATURE: 97 F | SYSTOLIC BLOOD PRESSURE: 130 MMHG | RESPIRATION RATE: 17 BRPM

## 2021-08-05 DIAGNOSIS — N84.0 POLYP OF CORPUS UTERI: ICD-10-CM

## 2021-08-05 DIAGNOSIS — Z98.890 OTHER SPECIFIED POSTPROCEDURAL STATES: Chronic | ICD-10-CM

## 2021-08-05 DIAGNOSIS — Z86.79 PERSONAL HISTORY OF OTHER DISEASES OF THE CIRCULATORY SYSTEM: ICD-10-CM

## 2021-08-05 LAB
ANION GAP SERPL CALC-SCNC: 12 MMOL/L — SIGNIFICANT CHANGE UP (ref 7–14)
BUN SERPL-MCNC: 29 MG/DL — HIGH (ref 7–23)
CALCIUM SERPL-MCNC: 10 MG/DL — SIGNIFICANT CHANGE UP (ref 8.4–10.5)
CHLORIDE SERPL-SCNC: 103 MMOL/L — SIGNIFICANT CHANGE UP (ref 98–107)
CO2 SERPL-SCNC: 25 MMOL/L — SIGNIFICANT CHANGE UP (ref 22–31)
CREAT SERPL-MCNC: 0.86 MG/DL — SIGNIFICANT CHANGE UP (ref 0.5–1.3)
GLUCOSE SERPL-MCNC: 89 MG/DL — SIGNIFICANT CHANGE UP (ref 70–99)
HCT VFR BLD CALC: 40.7 % — SIGNIFICANT CHANGE UP (ref 34.5–45)
HGB BLD-MCNC: 12.7 G/DL — SIGNIFICANT CHANGE UP (ref 11.5–15.5)
MCHC RBC-ENTMCNC: 27.5 PG — SIGNIFICANT CHANGE UP (ref 27–34)
MCHC RBC-ENTMCNC: 31.2 GM/DL — LOW (ref 32–36)
MCV RBC AUTO: 88.3 FL — SIGNIFICANT CHANGE UP (ref 80–100)
NRBC # BLD: 0 /100 WBCS — SIGNIFICANT CHANGE UP
NRBC # FLD: 0 K/UL — SIGNIFICANT CHANGE UP
PLATELET # BLD AUTO: 298 K/UL — SIGNIFICANT CHANGE UP (ref 150–400)
POTASSIUM SERPL-MCNC: 4 MMOL/L — SIGNIFICANT CHANGE UP (ref 3.5–5.3)
POTASSIUM SERPL-SCNC: 4 MMOL/L — SIGNIFICANT CHANGE UP (ref 3.5–5.3)
RBC # BLD: 4.61 M/UL — SIGNIFICANT CHANGE UP (ref 3.8–5.2)
RBC # FLD: 13 % — SIGNIFICANT CHANGE UP (ref 10.3–14.5)
SODIUM SERPL-SCNC: 140 MMOL/L — SIGNIFICANT CHANGE UP (ref 135–145)
WBC # BLD: 6.41 K/UL — SIGNIFICANT CHANGE UP (ref 3.8–10.5)
WBC # FLD AUTO: 6.41 K/UL — SIGNIFICANT CHANGE UP (ref 3.8–10.5)

## 2021-08-05 RX ORDER — FUROSEMIDE 40 MG
0 TABLET ORAL
Qty: 0 | Refills: 0 | DISCHARGE

## 2021-08-05 RX ORDER — SODIUM CHLORIDE 9 MG/ML
1000 INJECTION, SOLUTION INTRAVENOUS
Refills: 0 | Status: DISCONTINUED | OUTPATIENT
Start: 2021-08-16 | End: 2021-08-30

## 2021-08-05 RX ORDER — METOPROLOL TARTRATE 50 MG
1 TABLET ORAL
Qty: 0 | Refills: 0 | DISCHARGE

## 2021-08-05 NOTE — H&P PST ADULT - GENITOURINARY COMMENTS
hx of anemia, with hx of trace vaginal spotting.  Uterine Polyp discovered on imaging.  Scheduled for Examination under anesthesia, diagnostic Hysteroscopy, dilation , curettage polypectomy reports hx anemia, s/p imaging which detected thickened endometrium, and uterine polyp.    Scheduled for Examination under anesthesia, diagnostic Hysteroscopy, dilation , curettage polypectomy

## 2021-08-05 NOTE — H&P PST ADULT - CARDIOVASCULAR COMMENTS
hx of afib intermittently, on xarelto and low dose ASA.  I spoke with Dr. Inderjit Grant's office.  Dr. Mcmillan will advise pt regarding regimen for Xarelto pre-op

## 2021-08-05 NOTE — H&P PST ADULT - NSICDXFAMILYHX_GEN_ALL_CORE_FT
FAMILY HISTORY:  Patient's father is ,  at age 57 heart Attack    Mother  Still living? Unknown  Family history of breast cancer, Age at diagnosis: Age Unknown    Sibling  Still living? Unknown  Family history of breast cancer, Age at diagnosis: Age Unknown

## 2021-08-05 NOTE — H&P PST ADULT - NSICDXPROBLEM_GEN_ALL_CORE_FT
PROBLEM DIAGNOSES  Problem: Uterine polyp  Assessment and Plan: Examination under anesthesia, diagnostic Hysteroscopy, dilation , curettage polypectomy   CBC BMP EKG   pre-op instructions given and explained      Problem: History of atrial fibrillation  Assessment and Plan: Pt on Xarelto and low dose ASA.  I spoke with Dr. Hipolito Grant office.  Dr. Mcmillan will advise pt regarding regimen for Xarelto pre-op.  Pt continues on low dose ASA.  pre-op appt withPMD scheduled 8/11/21 per pt

## 2021-08-05 NOTE — H&P PST ADULT - NSICDXPASTSURGICALHX_GEN_ALL_CORE_FT
PAST SURGICAL HISTORY:  Anomaly of diaphragm chest surgery at age 1    History of bilateral mastectomy     S/P D&C     S/P T&A (status post tonsillectomy and adenoidectomy)      PAST SURGICAL HISTORY:  Anomaly of diaphragm chest surgery at age 1    H/O dilation and curettage 2013    History of bilateral mastectomy with reconstruction    S/P D&C     S/P T&A (status post tonsillectomy and adenoidectomy)

## 2021-08-05 NOTE — H&P PST ADULT - ASSESSMENT
69 y/o female reports hx anemia, s/p imaging which detected thickened endometrium, and uterine polyp.    Scheduled for Examination under anesthesia, diagnostic Hysteroscopy, dilation , curettage polypectomy

## 2021-08-05 NOTE — H&P PST ADULT - NSICDXPASTMEDICALHX_GEN_ALL_CORE_FT
PAST MEDICAL HISTORY:  CA - breast cancer     Elevated cholesterol     Hx of mitral valve prolapse     Hypertension     Migraines     Uterine polyp     Vertigo positional     PAST MEDICAL HISTORY:  CA - breast cancer     Elevated cholesterol     Hx of mitral valve prolapse     Hypertension     Kidney stone passed spontaneously    Migraines     Uterine polyp     Vertigo positional

## 2021-08-05 NOTE — H&P PST ADULT - HISTORY OF PRESENT ILLNESS
71 y/o female with hx of anemia, with hx of trace vaginal spotting.  Uterine Polyp discovered on imaging.  Scheduled for Examination under anesthesia, diagnostic Hysteroscopy, dilation , curettage polypectomy  69 y/o female reports hx anemia, s/p imaging which detected thickened endometrium, and uterine polyp.    Scheduled for Examination under anesthesia, diagnostic Hysteroscopy, dilation , curettage polypectomy

## 2021-08-05 NOTE — H&P PST ADULT - ATTENDING COMMENTS
Patient seen in presurgical holding area with her friend/neighbor present for the informed consent discussion.  R/B/A were reviewed & understood; consent is signed & witnessed in the chart.

## 2021-08-13 NOTE — ASU PATIENT PROFILE, ADULT - NSICDXPASTSURGICALHX_GEN_ALL_CORE_FT
PAST SURGICAL HISTORY:  Anomaly of diaphragm chest surgery at age 1    H/O dilation and curettage 2013    History of bilateral mastectomy with reconstruction    S/P D&C     S/P T&A (status post tonsillectomy and adenoidectomy)

## 2021-08-13 NOTE — ASU PATIENT PROFILE, ADULT - NSICDXPASTMEDICALHX_GEN_ALL_CORE_FT
PAST MEDICAL HISTORY:  CA - breast cancer     Elevated cholesterol     Hx of mitral valve prolapse     Hypertension     Kidney stone passed spontaneously    Migraines     Uterine polyp     Vertigo positional

## 2021-08-13 NOTE — ASU PATIENT PROFILE, ADULT - NPO AFTER
Triage note: Patient started with cold symptoms two days ago, last night Mother stating patient start to have difficulty breathing, retracting, wheezing. Patient has been taking respiratory treatment every 4 hours. Last treatment was 5am with no improvement. 23:00

## 2021-08-15 ENCOUNTER — TRANSCRIPTION ENCOUNTER (OUTPATIENT)
Age: 71
End: 2021-08-15

## 2021-08-16 ENCOUNTER — OUTPATIENT (OUTPATIENT)
Dept: OUTPATIENT SERVICES | Facility: HOSPITAL | Age: 71
LOS: 1 days | Discharge: HOME | End: 2021-08-16
Payer: MEDICARE

## 2021-08-16 ENCOUNTER — APPOINTMENT (OUTPATIENT)
Dept: GYNECOLOGIC ONCOLOGY | Facility: HOSPITAL | Age: 71
End: 2021-08-16

## 2021-08-16 ENCOUNTER — RESULT REVIEW (OUTPATIENT)
Age: 71
End: 2021-08-16

## 2021-08-16 VITALS
RESPIRATION RATE: 16 BRPM | WEIGHT: 186.07 LBS | HEART RATE: 58 BPM | DIASTOLIC BLOOD PRESSURE: 67 MMHG | SYSTOLIC BLOOD PRESSURE: 113 MMHG | TEMPERATURE: 98 F | OXYGEN SATURATION: 99 % | HEIGHT: 68 IN

## 2021-08-16 VITALS
OXYGEN SATURATION: 96 % | DIASTOLIC BLOOD PRESSURE: 70 MMHG | HEART RATE: 67 BPM | RESPIRATION RATE: 18 BRPM | SYSTOLIC BLOOD PRESSURE: 129 MMHG

## 2021-08-16 DIAGNOSIS — Z98.890 OTHER SPECIFIED POSTPROCEDURAL STATES: Chronic | ICD-10-CM

## 2021-08-16 DIAGNOSIS — N84.0 POLYP OF CORPUS UTERI: ICD-10-CM

## 2021-08-16 PROBLEM — N20.0 CALCULUS OF KIDNEY: Chronic | Status: ACTIVE | Noted: 2021-08-05

## 2021-08-16 LAB — SARS-COV-2 N GENE NPH QL NAA+PROBE: NOT DETECTED

## 2021-08-16 PROCEDURE — 88305 TISSUE EXAM BY PATHOLOGIST: CPT | Mod: 26

## 2021-08-16 PROCEDURE — 58558 HYSTEROSCOPY BIOPSY: CPT | Mod: GC

## 2021-08-16 RX ORDER — FUROSEMIDE 40 MG
1 TABLET ORAL
Qty: 0 | Refills: 0 | DISCHARGE

## 2021-08-16 RX ORDER — ACETAMINOPHEN 500 MG
3 TABLET ORAL
Qty: 0 | Refills: 0 | DISCHARGE

## 2021-08-16 RX ORDER — ATORVASTATIN CALCIUM 80 MG/1
1 TABLET, FILM COATED ORAL
Qty: 0 | Refills: 0 | DISCHARGE

## 2021-08-16 RX ORDER — RIVAROXABAN 15 MG-20MG
1 KIT ORAL
Qty: 0 | Refills: 0 | DISCHARGE

## 2021-08-16 RX ORDER — ASPIRIN/CALCIUM CARB/MAGNESIUM 324 MG
81 TABLET ORAL
Qty: 0 | Refills: 0 | DISCHARGE

## 2021-08-16 RX ORDER — SODIUM CHLORIDE 9 MG/ML
1000 INJECTION, SOLUTION INTRAVENOUS
Refills: 0 | Status: DISCONTINUED | OUTPATIENT
Start: 2021-08-16 | End: 2021-08-30

## 2021-08-16 RX ORDER — IBUPROFEN 200 MG
1 TABLET ORAL
Qty: 0 | Refills: 0 | DISCHARGE

## 2021-08-16 RX ORDER — METOPROLOL TARTRATE 50 MG
1 TABLET ORAL
Qty: 0 | Refills: 0 | DISCHARGE

## 2021-08-16 RX ORDER — MULTIVIT-MIN/FERROUS GLUCONATE 9 MG/15 ML
1 LIQUID (ML) ORAL
Qty: 0 | Refills: 0 | DISCHARGE

## 2021-08-16 RX ADMIN — SODIUM CHLORIDE 125 MILLILITER(S): 9 INJECTION, SOLUTION INTRAVENOUS at 09:42

## 2021-08-16 RX ADMIN — SODIUM CHLORIDE 30 MILLILITER(S): 9 INJECTION, SOLUTION INTRAVENOUS at 07:34

## 2021-08-16 NOTE — BRIEF OPERATIVE NOTE - NSICDXBRIEFPROCEDURE_GEN_ALL_CORE_FT
PROCEDURES:  Dilation and curettage, with uterine polypectomy and video hysteroscopy 16-Aug-2021 09:21:34  Alicia Johns

## 2021-08-16 NOTE — ASU DISCHARGE PLAN (ADULT/PEDIATRIC) - ACTIVITY LEVEL
No excercise/No heavy lifting/Nothing per rectum/Nothing per vagina/No tub baths/No douching/No tampons/No intercourse until check with md/No excercise/No heavy lifting/Nothing per rectum/Nothing per vagina/No tub baths/No douching/No tampons/No intercourse

## 2021-08-16 NOTE — ASU DISCHARGE PLAN (ADULT/PEDIATRIC) - ASU DC SPECIAL INSTRUCTIONSFT
Regular diet as tolerated, regular activity as tolerated, no heavy lifting first two weeks.  Nothing per vagina: no intercourse, tampons or douching. Call your doctor if you experience fevers, chills, worsening abdominal pain, inability to urinate or worsening vaginal bleeding. You may experience some spotting, this is normal. Please call Dr. Richardson's office if you experience heavy vaginal bleeding. You may also experience some dark brown vaginal discharge, this is normal.     Follow up with Dr. Richardson in 2 weeks.

## 2021-08-16 NOTE — ASU DISCHARGE PLAN (ADULT/PEDIATRIC) - CARE PROVIDER_API CALL
Tara Richardson)  Gynecologic Oncology; Obstetrics and Gynecology  [address] [place] [address]  Phone: [phone]  Fax: [phone]  Follow Up Time:

## 2021-08-16 NOTE — BRIEF OPERATIVE NOTE - COMMENTS
Silver nitrate used for hemostasis at tenaculum site Silver nitrate used for hemostasis at tenaculum site  Dictation #[id number]

## 2021-08-16 NOTE — BRIEF OPERATIVE NOTE - OPERATION/FINDINGS
EUA: Small, mobile, anteverted uterus. Adnexa nonpalpable bilaterally. R anterior skin lesion approx 8mm with crusting, consistent with folliculitis    Hysteroscopic: Subcentimeter anterior fundal polyp resected in its entirety. Otherwise atrophic appearing cavity. Ostia visualized and wnl bilaterally EUA: Small, mobile, anteverted uterus. Adnexa nonpalpable bilaterally. R anterior Mons skin lesion approx 8mm with crusting, consistent with folliculitis    Hysteroscopic: Subcentimeter anterior fundal polyp resected in its entirety. Otherwise atrophic appearing cavity. Ostia visualized and wnl bilaterally  Bleeding noted from right tenaculum site. Silver nitrate applied.

## 2021-08-16 NOTE — ASU DISCHARGE PLAN (ADULT/PEDIATRIC) - NURSING INSTRUCTIONS
DO NOT take any Tylenol (Acetaminophen) or narcotics containing Tylenol until after  __2pm  ___ . You received Tylenol during your operation and it can cause damage to your liver if too much is taken within a 24 hour time period.   You received IV Toradol for pain management Please DO NOT take Motrin/Ibuprofen/Advil/Aleve/NSAIDs (Non-Steroidal Anti-Inflammatory Drugs) for the next 6 hours (until _2:40pm .   Nothing in vagina, no intercourse, no tampons, no tub baths, no swimming, no douching until cleared by MD

## 2021-08-17 ENCOUNTER — NON-APPOINTMENT (OUTPATIENT)
Age: 71
End: 2021-08-17

## 2021-08-23 ENCOUNTER — NON-APPOINTMENT (OUTPATIENT)
Age: 71
End: 2021-08-23

## 2021-08-24 LAB — SURGICAL PATHOLOGY STUDY: SIGNIFICANT CHANGE UP

## 2021-08-30 ENCOUNTER — NON-APPOINTMENT (OUTPATIENT)
Age: 71
End: 2021-08-30

## 2021-08-31 ENCOUNTER — APPOINTMENT (OUTPATIENT)
Dept: GYNECOLOGIC ONCOLOGY | Facility: CLINIC | Age: 71
End: 2021-08-31

## 2021-09-07 ENCOUNTER — APPOINTMENT (OUTPATIENT)
Dept: GYNECOLOGIC ONCOLOGY | Facility: CLINIC | Age: 71
End: 2021-09-07
Payer: MEDICARE

## 2021-09-07 VITALS
HEIGHT: 68 IN | HEART RATE: 59 BPM | OXYGEN SATURATION: 100 % | BODY MASS INDEX: 27.74 KG/M2 | TEMPERATURE: 97.2 F | SYSTOLIC BLOOD PRESSURE: 144 MMHG | DIASTOLIC BLOOD PRESSURE: 82 MMHG | WEIGHT: 183 LBS

## 2021-09-07 DIAGNOSIS — N84.0 POLYP OF CORPUS UTERI: ICD-10-CM

## 2021-09-07 PROCEDURE — 99212 OFFICE O/P EST SF 10 MIN: CPT

## 2021-09-07 RX ORDER — UBIDECARENONE/VIT E ACET 100MG-5
CAPSULE ORAL
Refills: 0 | Status: ACTIVE | COMMUNITY

## 2021-09-07 RX ORDER — BIOTIN 10 MG
200 (65 FE) TABLET ORAL
Refills: 0 | Status: DISCONTINUED | COMMUNITY
End: 2021-09-07

## 2021-09-08 PROBLEM — R93.89 THICKENED ENDOMETRIUM: Status: ACTIVE | Noted: 2021-06-25

## 2021-09-08 PROBLEM — N84.0 ENDOMETRIAL POLYP: Status: ACTIVE | Noted: 2021-01-26

## 2021-11-17 NOTE — HISTORY OF PRESENT ILLNESS
normal... 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 She has been attending physical therapy and notes improvements in strength and ROM. She is still experiencing episodes of instability where she feels as if her left leg is going to give out on her. Shes says that on 6/12/2019 she began was training upper body with her  and was experiencing pain that radiated down her left lower extremity that was debilitating at the time, she has been able to return to exercise since the incident. She also reports numbness and tingling to the 4th and 5th fingers of her hands that is exacerbated with exertion of her hands. She did have a recent MRI of the cervical spine which revealed no compression or significant stenosis in Well appearing, awake, alert, oriented to person, place, time/situation and in no apparent distress.

## 2021-11-18 ENCOUNTER — APPOINTMENT (OUTPATIENT)
Dept: NEUROLOGY | Facility: CLINIC | Age: 71
End: 2021-11-18
Payer: MEDICARE

## 2021-11-18 ENCOUNTER — NON-APPOINTMENT (OUTPATIENT)
Age: 71
End: 2021-11-18

## 2021-11-18 VITALS
HEART RATE: 62 BPM | BODY MASS INDEX: 27.74 KG/M2 | HEIGHT: 68 IN | SYSTOLIC BLOOD PRESSURE: 140 MMHG | WEIGHT: 183 LBS | DIASTOLIC BLOOD PRESSURE: 82 MMHG

## 2021-11-18 VITALS
SYSTOLIC BLOOD PRESSURE: 140 MMHG | DIASTOLIC BLOOD PRESSURE: 82 MMHG | HEART RATE: 62 BPM | WEIGHT: 183 LBS | BODY MASS INDEX: 27.74 KG/M2 | HEIGHT: 68 IN

## 2021-11-18 DIAGNOSIS — G56.20 LESION OF ULNAR NERVE, UNSPECIFIED UPPER LIMB: ICD-10-CM

## 2021-11-18 PROCEDURE — 99214 OFFICE O/P EST MOD 30 MIN: CPT

## 2021-11-18 NOTE — REASON FOR VISIT
[Follow-Up: _____] : a [unfilled] follow-up visit [FreeTextEntry1] : Chronic headache disorder benign positional vertigo and vasovagal syncope

## 2021-11-18 NOTE — ASSESSMENT
[FreeTextEntry1] : Impression: This patient has a history of chronic migraine benign positional vertigo and ulnar sensory neuropathies.  These problems are stable and there has been no significant recurrence.  Status post vasovagal near syncope following Covid vaccine and at that time she tested positive for Covid.  Today's neurological exam is intact.  She has a history of atrial fibrillation hyperlipidemia vitamin D deficiency and anemia\par \par Recommendations: Office follow-up in 6 months\par

## 2021-11-18 NOTE — HISTORY OF PRESENT ILLNESS
[FreeTextEntry1] : Corie Vasquez is seen for an office visit.  She is not having significant recurrent headaches or episodes of vertigo.  She has had no further syncope.  In the past she has had intermittent numbness of the bilateral fourth and fifth fingers suggesting ulnar neuropathy which is not progressed and only occurs rarely.  In this setting she will have a cramping of both hands on occasion.  She is not having any other cranial cervical complaints weakness in the hands or cramping in other extremities such as the lower limbs.\par \par Medications include Xarelto metoprolol atorvastatin Lasix aspirin and vitamin D.

## 2021-11-18 NOTE — ASSESSMENT
[FreeTextEntry1] : Impression: This patient has history of chronic migraine headache disorder benign positional vertigo vasovagal syncope and she has had sensory complaints of both hands suggesting ulnar neuritis.  These are problems which are chronic and for the most part are not causing her any complaints at this time.  In this setting she has cramping of both hands of unclear etiology.  This could possibly be related to Lasix and dehydration or electrolyte imbalance.  Neurological exam is unremarkable.\par \par Recommendations: Defer further neurological work-up.  Magnesium as directed as needed.  Discussed with primary care physician or cardiology regarding the cramps affecting both hands.  Office follow-up in 6 months.\par

## 2021-11-18 NOTE — REASON FOR VISIT
[Follow-Up: _____] : a [unfilled] follow-up visit [FreeTextEntry1] : Chronic headache disorder benign positional vertigo vasovagal syncope recent cramping of both hands

## 2021-11-18 NOTE — PHYSICAL EXAM
[FreeTextEntry1] : Head:  Normocephalic Neck: Supple nontender no carotid bruits.  Spine:  Nontender negative straight leg raising.\par \par Mental Status:  Alert Oriented X3 Speech normal and no aphasia or dysarthria.\par \par Cranial Nerves:  PERRL, Fundi normal Visual Fields full  EOMI no diplopia no ptosis no nystagmus, V through XII intact.\par \par Motor:  No drift, normal strength tone and coordination and no focal atrophy. No abnormal movements. No dysmetria.  Normal rapid alternating movements. \par \par DTRs: Symmetric and 2+.  Plantars flexor.  No Clonus.\par \par Sensory:  Normal testing with pin light touch and vibration and  Joint position sense.  Normal DSS to touch.\par \par Gait:  Normal including tandem walking heel toe walking and Rhomberg.\par \par Tinel's sign negative at both elbows and wrists.\par

## 2021-11-18 NOTE — HISTORY OF PRESENT ILLNESS
[FreeTextEntry1] : This patient did have episodes of near syncope following her first Moderna Covid vaccine injection at which time she was found to be Covid positive and basically asymptomatic..  She was hospitalized at [facility] for 4 days.  She was in atrial fibrillation and she does have a chronic history in this regard treated with metoprolol and Xarelto.  Was no focal neurological symptomatology.  She denies significant headache or recurrent vertigo.  She has only rare numbness occurring in both hands with no progression.\par \par Medication includes Xarelto metoprolol atorvastatin Lasix aspirin and vitamin D

## 2022-01-13 NOTE — ED PROVIDER NOTE - CPE EDP NEURO NORM
Update History & Physical    The Patient's History and Physical of 1/13/2022 was reviewed with the patient and I examined the patient. There was no change. The surgical site was confirmed by the patient and me. Plan:  The risk, benefits, expected outcome, and alternative to the recommended procedure have been discussed with the patient. Patient understands and wants to proceed with the procedure.     Electronically signed by Germaine Mcclure MD on 1/13/2022 at 9:49 AM normal...

## 2022-01-13 NOTE — H&P PST ADULT - PATIENT ON (OXYGEN DELIVERY METHOD)
I will START or STAY ON the medications listed below when I get home from the hospital:    ibuprofen 600 mg oral tablet  -- 1 tab(s) by mouth every 6 hours, As Needed -Mild Pain (1 - 3) - for moderate pain   -- Indication: For Status post repeat low transverse  section    FLUoxetine 10 mg oral capsule  -- 3 cap(s) by mouth once a day (at bedtime)  -- Indication: For Status post repeat low transverse  section    lanolin topical ointment  -- 1 application on skin every 6 hours, As needed, Sore Nipples  -- Indication: For Status post repeat low transverse  section    ferrous sulfate 325 mg (65 mg elemental iron) oral tablet  -- 1 tab(s) by mouth 3 times a day  -- Indication: For Status post repeat low transverse  section   room air

## 2022-02-01 ENCOUNTER — APPOINTMENT (OUTPATIENT)
Dept: RADIOLOGY | Facility: HOSPITAL | Age: 72
End: 2022-02-01
Payer: MEDICARE

## 2022-02-01 ENCOUNTER — OUTPATIENT (OUTPATIENT)
Dept: OUTPATIENT SERVICES | Facility: HOSPITAL | Age: 72
LOS: 1 days | End: 2022-02-01
Payer: MEDICARE

## 2022-02-01 DIAGNOSIS — Z98.890 OTHER SPECIFIED POSTPROCEDURAL STATES: Chronic | ICD-10-CM

## 2022-02-01 DIAGNOSIS — M85.00 FIBROUS DYSPLASIA (MONOSTOTIC), UNSPECIFIED SITE: ICD-10-CM

## 2022-02-01 DIAGNOSIS — Z00.8 ENCOUNTER FOR OTHER GENERAL EXAMINATION: ICD-10-CM

## 2022-02-01 PROCEDURE — 77080 DXA BONE DENSITY AXIAL: CPT | Mod: 26

## 2022-02-01 PROCEDURE — 77080 DXA BONE DENSITY AXIAL: CPT

## 2022-05-17 ENCOUNTER — APPOINTMENT (OUTPATIENT)
Dept: NEUROLOGY | Facility: CLINIC | Age: 72
End: 2022-05-17
Payer: MEDICARE

## 2022-05-17 VITALS
DIASTOLIC BLOOD PRESSURE: 80 MMHG | SYSTOLIC BLOOD PRESSURE: 122 MMHG | HEART RATE: 68 BPM | BODY MASS INDEX: 28.19 KG/M2 | HEIGHT: 68 IN | WEIGHT: 186 LBS

## 2022-05-17 PROCEDURE — 99214 OFFICE O/P EST MOD 30 MIN: CPT

## 2022-05-17 NOTE — HISTORY OF PRESENT ILLNESS
[FreeTextEntry1] : Ms. Vasquez is seen for an office visit.  She does have recurrent episodes of vasovagal near syncope without a definite pattern.  She puts her head down she gets relief.  She applies ice to the back of her neck.  She has not actually lost consciousness.  Headache and vertigo not recurred.  She was treated for gastric ulcer and she is taking omeprazole.  She was taking a baby aspirin which has been stopped. \par \par  She continues to take Xarelto for history of paroxysmal atrial fibrillation.  Her other medications are basically unchanged including metoprolol atorvastatin Lasix vitamin D and Xarelto and omeprazole.

## 2022-05-17 NOTE — PHYSICAL EXAM
[FreeTextEntry1] : Head:  Normocephalic Neck: Supple nontender no carotid bruits. \par \par Mental Status:  Alert Oriented X3 Speech normal and no aphasia or dysarthria.\par \par Cranial Nerves:  PERRL, Visual Fields full  EOMI no diplopia no ptosis no nystagmus, V through XII intact.\par \par Motor:  No drift, normal strength tone and coordination and no focal atrophy. No abnormal movements. No dysmetria.  Normal rapid alternating movements. \par \par DTRs: Symmetric and 2+.  Plantars flexor.  No Clonus.\par \par Sensory:  Normal testing with pin light touch and vibration and  Joint position sense.  Normal DSS to touch.\par \par Gait:  Normal including tandem walking heel toe walking and Rhomberg.\par

## 2022-05-17 NOTE — ASSESSMENT
[FreeTextEntry1] : Impression: This 71-year-old patient has a history of chronic headache disorder benign positional vertigo and she has recurrent episodes of vasovagal vagal near syncope.  Neurological exam is unremarkable in this regard.\par \par Recommendations: Office visit in 6 months.  I would make no change in medication neurological standpoint.\par

## 2022-11-17 ENCOUNTER — APPOINTMENT (OUTPATIENT)
Dept: NEUROLOGY | Facility: CLINIC | Age: 72
End: 2022-11-17

## 2022-11-17 VITALS
BODY MASS INDEX: 27.89 KG/M2 | HEIGHT: 68 IN | SYSTOLIC BLOOD PRESSURE: 122 MMHG | WEIGHT: 184 LBS | DIASTOLIC BLOOD PRESSURE: 76 MMHG | HEART RATE: 72 BPM

## 2022-11-17 PROCEDURE — 99214 OFFICE O/P EST MOD 30 MIN: CPT

## 2022-11-17 RX ORDER — ATORVASTATIN CALCIUM 10 MG/1
10 TABLET, FILM COATED ORAL
Qty: 90 | Refills: 0 | Status: ACTIVE | COMMUNITY
Start: 2022-03-26

## 2022-11-17 NOTE — REASON FOR VISIT
[Follow-Up: _____] : a [unfilled] follow-up visit [FreeTextEntry1] : Vasovagal near syncope history of vertigo headache

## 2022-11-17 NOTE — PHYSICAL EXAM
[FreeTextEntry1] : Head:  Normocephalic Neck: Supple nontender no carotid bruits.  \par \par Mental Status:  Alert Oriented X3 Speech normal and no aphasia or dysarthria.\par \par Cranial Nerves:  PERRL, Fundi normal Visual not visualized.  Full  EOMI no diplopia no ptosis no nystagmus, V through XII intact.\par \par Motor:  No drift, normal strength tone and coordination and no focal atrophy. No abnormal movements. No dysmetria.  Normal rapid alternating movements. \par \par DTRs: Symmetric and 2+.  Plantars flexor.  No Clonus.\par \par Sensory:  Normal testing with pin light touch and vibration and  Joint position sense.  Normal DSS to touch.\par \par Gait:  Normal including tandem walking heel toe walking and Rhomberg.\par

## 2022-11-17 NOTE — HISTORY OF PRESENT ILLNESS
[FreeTextEntry1] : This patient is seen for an office visit and she is doing quite well.  She has a reduction in the episodes of vasovagal near syncope since seeing a new PCP and being switched from furosemide to a different diuretic.  She no longer has any GI symptomatology  and a repeat endoscopy showed resolution of her ulcer.  She is not having significant vertigo or headache.

## 2022-12-21 ENCOUNTER — NON-APPOINTMENT (OUTPATIENT)
Age: 72
End: 2022-12-21

## 2022-12-21 DIAGNOSIS — Z87.448 PERSONAL HISTORY OF OTHER DISEASES OF URINARY SYSTEM: ICD-10-CM

## 2022-12-21 DIAGNOSIS — Z85.3 PERSONAL HISTORY OF MALIGNANT NEOPLASM OF BREAST: ICD-10-CM

## 2022-12-21 DIAGNOSIS — Z92.89 PERSONAL HISTORY OF OTHER MEDICAL TREATMENT: ICD-10-CM

## 2022-12-21 DIAGNOSIS — Z86.010 PERSONAL HISTORY OF COLONIC POLYPS: ICD-10-CM

## 2022-12-21 DIAGNOSIS — Z87.442 PERSONAL HISTORY OF URINARY CALCULI: ICD-10-CM

## 2022-12-21 DIAGNOSIS — M85.80 OTHER SPECIFIED DISORDERS OF BONE DENSITY AND STRUCTURE, UNSPECIFIED SITE: ICD-10-CM

## 2022-12-21 DIAGNOSIS — I48.91 UNSPECIFIED ATRIAL FIBRILLATION: ICD-10-CM

## 2023-06-14 NOTE — ED ADULT NURSE NOTE - FALL HARM RISK TYPE OF ASSESSMENT
Daily Assessment

## 2023-06-20 ENCOUNTER — APPOINTMENT (OUTPATIENT)
Dept: NEUROLOGY | Facility: CLINIC | Age: 73
End: 2023-06-20
Payer: MEDICARE

## 2023-06-20 VITALS
DIASTOLIC BLOOD PRESSURE: 79 MMHG | TEMPERATURE: 97.1 F | RESPIRATION RATE: 17 BRPM | HEART RATE: 58 BPM | SYSTOLIC BLOOD PRESSURE: 144 MMHG | BODY MASS INDEX: 28.49 KG/M2 | HEIGHT: 68 IN | WEIGHT: 188 LBS | OXYGEN SATURATION: 98 %

## 2023-06-20 PROCEDURE — 99215 OFFICE O/P EST HI 40 MIN: CPT

## 2023-06-20 NOTE — ASSESSMENT
[FreeTextEntry1] : Impression: This 72-year-old female patient has a history of a chronic headache disorder essentially resolved benign positional vertigo and vasovagal near syncope.  She did have a recurrence episode of near syncope while vacationing on 12/22/2023.  She denies definite recurrent episodes of vertigo.  Neurological exam is intact.\par \par Recommendations: Agree with medication and would make no change from the neurological standpoint.  Office follow-up in 6 months.\par

## 2023-06-20 NOTE — HISTORY OF PRESENT ILLNESS
[FreeTextEntry1] : This patient is seen for an office visit.  He did have another episode of near syncope while vacationing in [place] on 12/22/2023.  Apparently she was walking by the pool looked up and collapsed.  She did not strike her head and there was no definite loss of consciousness.  It is unclear if there was any vertigo which she does basically deny.  She was found to have a nondisplaced small fracture of the right tibia and was treated with a cast and then a boot with complete recovery.  Since then she had another episode 1 week ago of mild dizziness.  She denies any recurrent headache.  She is not having significant recurrent vertigo.  Medications have been reviewed in detail.

## 2023-06-20 NOTE — REASON FOR VISIT
[Follow-Up: _____] : a [unfilled] follow-up visit [FreeTextEntry1] : Vasovagal near syncope vertigo remote history of headache

## 2023-06-20 NOTE — PHYSICAL EXAM
[FreeTextEntry1] : Head:  Normocephalic Neck: Supple nontender no carotid bruits. \par \par Mental Status:  Alert Oriented X3 Speech normal and no aphasia or dysarthria.\par \par Cranial Nerves:  PERRL, Visual Fields full  EOMI no diplopia no ptosis no nystagmus, V through XII intact.\par \par Motor:  No drift, normal strength tone and coordination and no focal atrophy. No abnormal movements. No dysmetria.  Normal rapid alternating movements. \par \par DTRs: Symmetric and 2+.  Plantars flexor.  No Clonus.\par \par Sensory:  Normal testing with pin light touch and position sense.\par \par Gait:  Normal including tandem walking heel toe walking and Rhomberg.\par

## 2023-06-21 NOTE — ED ADULT TRIAGE NOTE - BEFAST EYES
[Appropriately responsive] : appropriately responsive [Alert] : alert [No Acute Distress] : no acute distress [Soft] : soft [Non-tender] : non-tender [Non-distended] : non-distended [No HSM] : No HSM [No Lesions] : no lesions [No Mass] : no mass [Oriented x3] : oriented x3 [Examination Of The Breasts] : a normal appearance [No Masses] : no breast masses were palpable [Labia Majora] : normal [Labia Minora] : normal [Atrophy] : atrophy [Discharge] : a  ~M vaginal discharge was present [Scant] : scant [Foul Smelling] : foul smelling [Clear] : clear [Normal] : normal [Absent] : absent [Uterine Adnexae] : normal No

## 2023-11-25 ENCOUNTER — EMERGENCY (EMERGENCY)
Facility: HOSPITAL | Age: 73
LOS: 1 days | Discharge: HOME | End: 2023-11-25
Attending: STUDENT IN AN ORGANIZED HEALTH CARE EDUCATION/TRAINING PROGRAM | Admitting: STUDENT IN AN ORGANIZED HEALTH CARE EDUCATION/TRAINING PROGRAM
Payer: MEDICARE

## 2023-11-25 VITALS
TEMPERATURE: 98 F | WEIGHT: 188.05 LBS | DIASTOLIC BLOOD PRESSURE: 84 MMHG | HEIGHT: 68 IN | RESPIRATION RATE: 18 BRPM | SYSTOLIC BLOOD PRESSURE: 149 MMHG | HEART RATE: 71 BPM | OXYGEN SATURATION: 97 %

## 2023-11-25 DIAGNOSIS — Z98.890 OTHER SPECIFIED POSTPROCEDURAL STATES: Chronic | ICD-10-CM

## 2023-11-25 PROCEDURE — 99284 EMERGENCY DEPT VISIT MOD MDM: CPT | Mod: 25

## 2023-11-25 PROCEDURE — 72125 CT NECK SPINE W/O DYE: CPT | Mod: MA

## 2023-11-25 PROCEDURE — 70486 CT MAXILLOFACIAL W/O DYE: CPT | Mod: 26,MA

## 2023-11-25 PROCEDURE — 70450 CT HEAD/BRAIN W/O DYE: CPT | Mod: MA

## 2023-11-25 PROCEDURE — 12013 RPR F/E/E/N/L/M 2.6-5.0 CM: CPT

## 2023-11-25 PROCEDURE — 72125 CT NECK SPINE W/O DYE: CPT | Mod: 26,MA

## 2023-11-25 PROCEDURE — 70450 CT HEAD/BRAIN W/O DYE: CPT | Mod: 26,MA

## 2023-11-25 PROCEDURE — 70486 CT MAXILLOFACIAL W/O DYE: CPT | Mod: MA

## 2023-11-25 PROCEDURE — G0168: CPT

## 2023-11-25 NOTE — ED PROVIDER NOTE - PATIENT PORTAL LINK FT
You can access the FollowMyHealth Patient Portal offered by [facility] by registering at the following website: [email or web address]. By joining [employer]’s FollowMyHealth portal, you will also be able to view your health information using other applications (apps) compatible with our system.

## 2023-11-25 NOTE — ED PROVIDER NOTE - PHYSICAL EXAMINATION
VITAL SIGNS: I have reviewed nursing notes and confirm.   GEN: Well-developed; well-nourished; in no acute distress. Speaking full sentences.  SKIN: Warm, pink, no rash, no diaphoresis, no cyanosis, well perfused.   HEAD: Normocephalic; atraumatic. No scalp lacerations, no abrasions. (+) Laceration horizontal linear 3cm of the right eye lid. (+) periorbital ecchymosis.   NECK: Supple; non tender.   EYES: Pupils 3mm equal, round, reactive to light and accomodation, conjunctiva and sclera clear. Extra-ocular movements intact bilaterally.  ENT: No nasal discharge; airway clear. Trachea is midline. Normal dentition.  CV: RRR. S1, S2 normal; no murmurs, gallops, or rubs. Capillary refill < 2 seconds throughout. Distal pulses intact 2+ throughout.  RESP: CTA bilaterally. No wheezes, rales, or rhonchi.   ABD: Normal bowel sounds, soft, non-distended, non-tender, no rebound, no guarding, no rigidity, no hepatosplenomegaly.  MSK: Normal range of motion and movement of all 4 extremities. No apparent joint or muscular pain throughout.    BACK: No thoracolumbar midline or paravertebral tenderness. No step-offs or obvious deformities.  NEURO: Alert & oriented x 3, Grossly unremarkable. Sensory and motor intact throughout. No focal deficits. Gait: Fluid. Normal speech and coordination.

## 2023-11-25 NOTE — ED ADULT NURSE NOTE - OBJECTIVE STATEMENT
Pt a&ox4 ambulatory to ED s/p fall this morning. +head strike, no LOC. Pt on Xarelto, has noted bruising to right eye and a small laceration to right upper orbit. Pt denies any dizziness, visual changes or weakness.

## 2023-11-25 NOTE — ED PROVIDER NOTE - NSFOLLOWUPINSTRUCTIONS_ED_ALL_ED_FT
Rest, drink plenty of fluids.  Advance activity as tolerated.  Continue all previously prescribed medications as directed.  Follow up with your PMD 2-3 days and bring copies of your results.  Return to the ER for worsening symptoms, fevers, weakness, vomiting, headaches, chest pain, shortness of breath, or new concerning symptoms.    Laceration    A laceration is a cut that goes through all of the layers of the skin and into the tissue that is right under the skin. Some lacerations heal on their own. Others need to be closed with skin adhesive strips, skin glue, stitches (sutures), or staples. Proper laceration care minimizes the risk of infection and helps the laceration to heal better.  If non-absorbable stitches or staples have been placed, they must be taken out within the time frame instructed by your healthcare provider.    SEEK IMMEDIATE MEDICAL CARE IF YOU HAVE ANY OF THE FOLLOWING SYMPTOMS: swelling around the wound, worsening pain, drainage from the wound, red streaking going away from your wound, inability to move finger or toe near the laceration, or discoloration of skin near the laceration.    Keep area dry for 24 hours. Afterward, allow water to run over area but do not scrub or immerse in water. Avoid using oil based creams or products as this will cause the dermabond to dissolve.  Do not pull or attempt to remove Dermabond--allow wound to heal underneath and Dermabond will fall off on its own when ready.  Return to the emergency department if you experience worsening pain, fever, drainage from site, redness, red streaking, or any other emergency.

## 2023-11-25 NOTE — ED PROVIDER NOTE - PRINCIPAL DIAGNOSIS
Patient called to state that [facility] does not have the compression stockings in stock, and is requesting the order be sent to [facility]. Please advise, thank you! Eyelid laceration

## 2023-11-25 NOTE — ED ADULT NURSE NOTE - NSFALLUNIVINTERV_ED_ALL_ED
Bed/Stretcher in lowest position, wheels locked, appropriate side rails in place/Call bell, personal items and telephone in reach/Instruct patient to call for assistance before getting out of bed/chair/stretcher/Non-slip footwear applied when patient is off stretcher/[place] to call system/Physically safe environment - no spills, clutter or unnecessary equipment/Purposeful proactive rounding/Room/bathroom lighting operational, light cord in reach

## 2023-11-25 NOTE — ED PROVIDER NOTE - OBJECTIVE STATEMENT
72F PMHx of AFIB on xarelto, presenting with right eyelid laceration, head trauma s/p mechanical trip and fall at 10AM today. MARIS. No LOC. No neck pain. Otherwise: denies any chest pain, abdominal pain, shortness of breath, nausea/vomiting,  neck pain, back pain, hip pain, other extremity injury, LOC, syncope, lightheadedness,  headaches, visual complaints, rashes, fevers/chills, difficulty ambulating,  subjective neurological deficits, numbness/tingling.

## 2023-11-25 NOTE — ED PROVIDER NOTE - CLINICAL SUMMARY MEDICAL DECISION MAKING FREE TEXT BOX
Pt with yes hx of anticoagulation p/w  head injury and right eyelid laceration s/p mechanical trip and fall. ( no) LOC. Normal appearing without any signs or symptoms of serious injury on secondary trauma survey. Low suspicion for intracranial hemorrhage or other intracranial traumatic injury. No periorbital or posterior periauricular ecchymosis to suggest skull fracture. No abdominal ecchymosis to indicate concern for serious trauma to the thorax or abdomen. Pelvis without evidence of injury and patient is neurologically intact. Stable gait and tolerating PO. Otherwise no other evidence of deformity or joint instability.  - CT HEAD and NECK and face negative  - laceration repaired with dermabond.  - Pain control PRN, Anti-emetics PRN  - Re-eval for disposition.

## 2023-11-25 NOTE — ED ADULT TRIAGE NOTE - CHIEF COMPLAINT QUOTE
S/P trip and fall hitting head on ground. Laceration ro right eyebrow noted with bruising. Pt takes Xarelto. Denies LOC

## 2023-11-25 NOTE — ED ADULT TRIAGE NOTE - SOURCE OF INFORMATION
Take blood pressure at home and record intermittently and bring recordings to PCP follow up. If chest pain take blood pressure reading then. Patient

## 2023-11-29 NOTE — CHART NOTE - NSCHARTNOTEFT_GEN_A_CORE
72 y o female presenting to the ED on 11/25 complaining of head injury.  As per [facility], the patient has a scheduled neurology appointment on 12/13 @ 09:40 with Dr. Koehler.

## 2023-12-13 ENCOUNTER — APPOINTMENT (OUTPATIENT)
Dept: NEUROLOGY | Facility: CLINIC | Age: 73
End: 2023-12-13
Payer: MEDICARE

## 2023-12-13 VITALS
WEIGHT: 188 LBS | DIASTOLIC BLOOD PRESSURE: 70 MMHG | HEIGHT: 68 IN | SYSTOLIC BLOOD PRESSURE: 120 MMHG | HEART RATE: 68 BPM | BODY MASS INDEX: 28.49 KG/M2
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
## 2023-12-13 PROCEDURE — 99214 OFFICE O/P EST MOD 30 MIN: CPT

## 2023-12-13 RX ORDER — TRIAMTERENE 50 MG/1
CAPSULE ORAL
Refills: 0 | Status: ACTIVE | COMMUNITY

## 2023-12-13 NOTE — ASSESSMENT
[FreeTextEntry1] : Impression: This 72-year-old female has a history of resolved chronic headache disorder and positional vertigo.  She has occasional episodes of vasovagal near syncope without collapse.  She had a recent apparent mechanical fall with a small laceration below the right eyebrow.  Imaging including Scanning of brain maxillofacial bones and cervical spine was unremarkable.  She does receive Xarelto.  Neurological exam is intact.  Recommendations: Office follow-up in 6 months.

## 2023-12-13 NOTE — REASON FOR VISIT
[Follow-Up: _____] : a [unfilled] follow-up visit [FreeTextEntry1] : Vasovagal near syncope history of vertigo and headache recent mechanical fall with head trauma

## 2023-12-13 NOTE — PHYSICAL EXAM
[FreeTextEntry1] : Head:  Normocephalic Neck: Supple nontender no carotid bruits.   Mental Status:  Alert Oriented X3 Speech normal and no aphasia or dysarthria.  Cranial Nerves:  PERRL, Fundi normal Visual Fields full  EOMI no diplopia no ptosis no nystagmus, V through XII intact.  Small healing laceration below the right eyebrow.  Motor:  No drift, normal strength tone and coordination and no focal atrophy. No abnormal movements. No dysmetria.  Normal rapid alternating movements.   DTRs: Symmetric and 2+.  Plantars flexor.  No Clonus.  Sensory:  Normal testing with light touch.  Gait: Normal.

## 2023-12-13 NOTE — HISTORY OF PRESENT ILLNESS
[FreeTextEntry1] : This patient is seen for an office visit.  She was last seen on 6/20/2023.  She has rare episode of vasovagal near syncope.  She has had 2 brief episodes in the last 6 months and never actually collapsed or lost consciousness.  She denies recurrent headache or vertigo.  She did have a mechanical fall in the airport returning from [place].  She struck her head and had a small laceration below the right eyebrow.  She was seen in the [place] emergency room on 11/25/2023 the day of the event and had an unremarkable CAT scan of the brain maxillofacial bones and cervical spine.  Note that the patient does receive Xarelto.  Medications remarkable for discontinuation of furosemide and potassium and beginning triamterene.  Otherwise unchanged.

## 2024-01-19 NOTE — ED PROCEDURE NOTE - DEPTH OF REPAIR FOR WOUND CLOSURE
I called and left a message on machine for patient to return our call about scheduling a Nephrology Consult appointment ref by ALINA Gresham for CKD 3. I also explained to the patient that I was mailing him out a letter reminding him to return our call.    skin

## 2024-06-16 ENCOUNTER — NON-APPOINTMENT (OUTPATIENT)
Age: 74
End: 2024-06-16

## 2024-06-17 ENCOUNTER — APPOINTMENT (OUTPATIENT)
Dept: NEUROLOGY | Facility: CLINIC | Age: 74
End: 2024-06-17
Payer: MEDICARE

## 2024-06-17 VITALS
WEIGHT: 184 LBS | SYSTOLIC BLOOD PRESSURE: 147 MMHG | DIASTOLIC BLOOD PRESSURE: 73 MMHG | OXYGEN SATURATION: 97 % | HEIGHT: 68 IN | TEMPERATURE: 98.6 F | HEART RATE: 71 BPM | BODY MASS INDEX: 27.89 KG/M2

## 2024-06-17 VITALS
TEMPERATURE: 98.6 F | WEIGHT: 184 LBS | SYSTOLIC BLOOD PRESSURE: 147 MMHG | HEART RATE: 71 BPM | DIASTOLIC BLOOD PRESSURE: 73 MMHG | HEIGHT: 68 IN | OXYGEN SATURATION: 97 % | BODY MASS INDEX: 27.89 KG/M2

## 2024-06-17 DIAGNOSIS — H81.10 BENIGN PAROXYSMAL VERTIGO, UNSPECIFIED EAR: ICD-10-CM

## 2024-06-17 DIAGNOSIS — R55 SYNCOPE AND COLLAPSE: ICD-10-CM

## 2024-06-17 DIAGNOSIS — R51.9 HEADACHE, UNSPECIFIED: ICD-10-CM

## 2024-06-17 PROCEDURE — G2211 COMPLEX E/M VISIT ADD ON: CPT

## 2024-06-17 PROCEDURE — 99214 OFFICE O/P EST MOD 30 MIN: CPT

## 2024-06-17 RX ORDER — OMEPRAZOLE 20 MG/1
TABLET, DELAYED RELEASE ORAL
Refills: 0 | Status: COMPLETED | COMMUNITY
End: 2024-06-17

## 2024-06-17 RX ORDER — MAGNESIUM OXIDE/MAG AA CHELATE 300 MG
CAPSULE ORAL
Refills: 0 | Status: ACTIVE | COMMUNITY

## 2024-06-17 NOTE — PHYSICAL EXAM
[FreeTextEntry1] : Head:  Normocephalic Neck: Supple.  Mental Status:  Alert Oriented X3 Speech normal and no aphasia or dysarthria.  Cranial Nerves:  PERRL, Visual Fields full  EOMI no diplopia no ptosis no nystagmus, V through XII intact.  Motor:  No drift, normal strength tone and coordination and no focal atrophy. No abnormal movements. No dysmetria.  Normal rapid alternating movements.   DTRs: Symmetric and 2+.  Plantars flexor.  No Clonus.  Sensory: Unremarkable.  Gait:  Normal including tandem walking heel toe walking and Rhomberg.

## 2024-06-17 NOTE — ASSESSMENT
[FreeTextEntry1] : Impression: This 73-year-old female patient has a past history of a chronic headache disorder positional vertigo without recurrence.  She has occasional episodes of vasovagal near syncope.  She has a cramping of both hands occurring only occasionally.  Neurological exam is intact.  Recommendations: Magnesium once daily as directed.  I would make no other changes in the patient's medication profile.  Office follow-up in 1 year or as needed.

## 2024-06-17 NOTE — REASON FOR VISIT
[Follow-Up: _____] : a [unfilled] follow-up visit [FreeTextEntry1] : Vasovagal near syncope vertigo headache symptomatically improved

## 2024-06-17 NOTE — HISTORY OF PRESENT ILLNESS
[FreeTextEntry1] : This patient is seen for an office visit.  She was last seen on 12/13/2023.  She had 1 episode of near syncope while on a flight to [place].  With ice applied to the back of the neck and breast symptoms did resolve.  She is not having significant headache o recurrence of vertigo.  She has had occasional cramp and spasm in both hands and she does take magnesium.  She also receives triamterene atorvastatin metoprolol and Xarelto.

## 2024-09-24 NOTE — DISCHARGE NOTE ADULT - NS AS DC PROVIDER CONTACT Y/N MULTI
[de-identified] : Runny nose, cough and sore throat x 3 days
[de-identified] : Runny nose, cough and sore throat x 3 days
Yes

## 2025-02-12 ENCOUNTER — APPOINTMENT (OUTPATIENT)
Dept: OBGYN | Facility: CLINIC | Age: 75
End: 2025-02-12
Payer: MEDICARE

## 2025-02-12 VITALS
HEIGHT: 68 IN | SYSTOLIC BLOOD PRESSURE: 138 MMHG | OXYGEN SATURATION: 95 % | HEART RATE: 84 BPM | BODY MASS INDEX: 28.95 KG/M2 | WEIGHT: 191 LBS | DIASTOLIC BLOOD PRESSURE: 80 MMHG | TEMPERATURE: 97.6 F

## 2025-02-12 DIAGNOSIS — Z01.419 ENCOUNTER FOR GYNECOLOGICAL EXAMINATION (GENERAL) (ROUTINE) W/OUT ABNORMAL FINDINGS: ICD-10-CM

## 2025-02-12 DIAGNOSIS — N88.2 STRICTURE AND STENOSIS OF CERVIX UTERI: ICD-10-CM

## 2025-02-12 DIAGNOSIS — N95.2 POSTMENOPAUSAL ATROPHIC VAGINITIS: ICD-10-CM

## 2025-02-12 DIAGNOSIS — N84.0 POLYP OF CORPUS UTERI: ICD-10-CM

## 2025-02-12 LAB
BILIRUB UR QL STRIP: NORMAL
CLARITY UR: CLEAR
COLLECTION METHOD: NORMAL
GLUCOSE UR-MCNC: NORMAL
HCG UR QL: 0.2 EU/DL
HGB UR QL STRIP.AUTO: NORMAL
KETONES UR-MCNC: NORMAL
LEUKOCYTE ESTERASE UR QL STRIP: NORMAL
NITRITE UR QL STRIP: NORMAL
PH UR STRIP: 7.5
PROT UR STRIP-MCNC: NORMAL
SP GR UR STRIP: 1.02

## 2025-02-12 PROCEDURE — 99387 INIT PM E/M NEW PAT 65+ YRS: CPT | Mod: GY

## 2025-02-14 LAB — HPV HIGH+LOW RISK DNA PNL CVX: NOT DETECTED

## 2025-02-20 LAB — CYTOLOGY CVX/VAG DOC THIN PREP: NORMAL

## 2025-06-17 ENCOUNTER — NON-APPOINTMENT (OUTPATIENT)
Age: 75
End: 2025-06-17

## 2025-06-17 ENCOUNTER — APPOINTMENT (OUTPATIENT)
Dept: NEUROLOGY | Facility: CLINIC | Age: 75
End: 2025-06-17
Payer: MEDICARE

## 2025-06-17 VITALS
BODY MASS INDEX: 28.95 KG/M2 | DIASTOLIC BLOOD PRESSURE: 77 MMHG | HEART RATE: 56 BPM | WEIGHT: 191 LBS | OXYGEN SATURATION: 95 % | HEIGHT: 68 IN | SYSTOLIC BLOOD PRESSURE: 137 MMHG | TEMPERATURE: 98.2 F

## 2025-06-17 PROCEDURE — G2211 COMPLEX E/M VISIT ADD ON: CPT

## 2025-06-17 PROCEDURE — 99214 OFFICE O/P EST MOD 30 MIN: CPT
